# Patient Record
Sex: MALE | Race: WHITE | HISPANIC OR LATINO | Employment: UNEMPLOYED | URBAN - METROPOLITAN AREA
[De-identification: names, ages, dates, MRNs, and addresses within clinical notes are randomized per-mention and may not be internally consistent; named-entity substitution may affect disease eponyms.]

---
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
## 2022-12-11 ENCOUNTER — APPOINTMENT (OUTPATIENT)
Dept: RADIOLOGY | Facility: MEDICAL CENTER | Age: 22
DRG: 481 | End: 2022-12-11
Attending: EMERGENCY MEDICINE
Payer: COMMERCIAL

## 2022-12-11 ENCOUNTER — HOSPITAL ENCOUNTER (INPATIENT)
Facility: MEDICAL CENTER | Age: 22
LOS: 3 days | DRG: 481 | End: 2022-12-14
Attending: EMERGENCY MEDICINE | Admitting: STUDENT IN AN ORGANIZED HEALTH CARE EDUCATION/TRAINING PROGRAM
Payer: COMMERCIAL

## 2022-12-11 ENCOUNTER — APPOINTMENT (OUTPATIENT)
Dept: RADIOLOGY | Facility: MEDICAL CENTER | Age: 22
DRG: 481 | End: 2022-12-11
Attending: STUDENT IN AN ORGANIZED HEALTH CARE EDUCATION/TRAINING PROGRAM
Payer: COMMERCIAL

## 2022-12-11 DIAGNOSIS — E87.6 HYPOKALEMIA: ICD-10-CM

## 2022-12-11 DIAGNOSIS — S72.91XA CLOSED FRACTURE OF RIGHT FEMUR, UNSPECIFIED FRACTURE MORPHOLOGY, UNSPECIFIED PORTION OF FEMUR, INITIAL ENCOUNTER (HCC): Primary | ICD-10-CM

## 2022-12-11 DIAGNOSIS — E86.0 DEHYDRATION: ICD-10-CM

## 2022-12-11 DIAGNOSIS — F10.10 ALCOHOL ABUSE: ICD-10-CM

## 2022-12-11 PROBLEM — S72.8X1A OTHER FRACTURE OF RIGHT FEMUR, INITIAL ENCOUNTER FOR CLOSED FRACTURE (HCC): Status: ACTIVE | Noted: 2022-12-11

## 2022-12-11 LAB
ALBUMIN SERPL BCP-MCNC: 4.4 G/DL (ref 3.2–4.9)
ALBUMIN/GLOB SERPL: 1.3 G/DL
ALP SERPL-CCNC: 123 U/L (ref 30–99)
ALT SERPL-CCNC: 13 U/L (ref 2–50)
ANION GAP SERPL CALC-SCNC: 20 MMOL/L (ref 7–16)
AST SERPL-CCNC: 16 U/L (ref 12–45)
BASOPHILS # BLD AUTO: 0.6 % (ref 0–1.8)
BASOPHILS # BLD: 0.08 K/UL (ref 0–0.12)
BILIRUB SERPL-MCNC: 0.3 MG/DL (ref 0.1–1.5)
BUN SERPL-MCNC: 12 MG/DL (ref 8–22)
CALCIUM SERPL-MCNC: 9.4 MG/DL (ref 8.5–10.5)
CHLORIDE SERPL-SCNC: 103 MMOL/L (ref 96–112)
CO2 SERPL-SCNC: 18 MMOL/L (ref 20–33)
CREAT SERPL-MCNC: 0.7 MG/DL (ref 0.5–1.4)
EKG IMPRESSION: NORMAL
EOSINOPHIL # BLD AUTO: 0.16 K/UL (ref 0–0.51)
EOSINOPHIL NFR BLD: 1.2 % (ref 0–6.9)
ERYTHROCYTE [DISTWIDTH] IN BLOOD BY AUTOMATED COUNT: 44.7 FL (ref 35.9–50)
GFR SERPLBLD CREATININE-BSD FMLA CKD-EPI: 135 ML/MIN/1.73 M 2
GLOBULIN SER CALC-MCNC: 3.4 G/DL (ref 1.9–3.5)
GLUCOSE SERPL-MCNC: 132 MG/DL (ref 65–99)
HCT VFR BLD AUTO: 44.2 % (ref 42–52)
HGB BLD-MCNC: 14.8 G/DL (ref 14–18)
IMM GRANULOCYTES # BLD AUTO: 0.05 K/UL (ref 0–0.11)
IMM GRANULOCYTES NFR BLD AUTO: 0.4 % (ref 0–0.9)
LYMPHOCYTES # BLD AUTO: 3.77 K/UL (ref 1–4.8)
LYMPHOCYTES NFR BLD: 27.7 % (ref 22–41)
MCH RBC QN AUTO: 30.3 PG (ref 27–33)
MCHC RBC AUTO-ENTMCNC: 33.5 G/DL (ref 33.7–35.3)
MCV RBC AUTO: 90.6 FL (ref 81.4–97.8)
MONOCYTES # BLD AUTO: 1.2 K/UL (ref 0–0.85)
MONOCYTES NFR BLD AUTO: 8.8 % (ref 0–13.4)
NEUTROPHILS # BLD AUTO: 8.37 K/UL (ref 1.82–7.42)
NEUTROPHILS NFR BLD: 61.3 % (ref 44–72)
NRBC # BLD AUTO: 0 K/UL
NRBC BLD-RTO: 0 /100 WBC
PLATELET # BLD AUTO: 248 K/UL (ref 164–446)
PMV BLD AUTO: 11.1 FL (ref 9–12.9)
POTASSIUM SERPL-SCNC: 3 MMOL/L (ref 3.6–5.5)
PROT SERPL-MCNC: 7.8 G/DL (ref 6–8.2)
RBC # BLD AUTO: 4.88 M/UL (ref 4.7–6.1)
SODIUM SERPL-SCNC: 141 MMOL/L (ref 135–145)
WBC # BLD AUTO: 13.6 K/UL (ref 4.8–10.8)

## 2022-12-11 PROCEDURE — 85025 COMPLETE CBC W/AUTO DIFF WBC: CPT

## 2022-12-11 PROCEDURE — 0QSBXZZ REPOSITION RIGHT LOWER FEMUR, EXTERNAL APPROACH: ICD-10-PCS | Performed by: EMERGENCY MEDICINE

## 2022-12-11 PROCEDURE — 83735 ASSAY OF MAGNESIUM: CPT

## 2022-12-11 PROCEDURE — 96374 THER/PROPH/DIAG INJ IV PUSH: CPT

## 2022-12-11 PROCEDURE — 99291 CRITICAL CARE FIRST HOUR: CPT

## 2022-12-11 PROCEDURE — 99223 1ST HOSP IP/OBS HIGH 75: CPT | Performed by: STUDENT IN AN ORGANIZED HEALTH CARE EDUCATION/TRAINING PROGRAM

## 2022-12-11 PROCEDURE — 700117 HCHG RX CONTRAST REV CODE 255: Performed by: EMERGENCY MEDICINE

## 2022-12-11 PROCEDURE — 80053 COMPREHEN METABOLIC PANEL: CPT

## 2022-12-11 PROCEDURE — 700101 HCHG RX REV CODE 250: Performed by: EMERGENCY MEDICINE

## 2022-12-11 PROCEDURE — 700105 HCHG RX REV CODE 258: Performed by: EMERGENCY MEDICINE

## 2022-12-11 PROCEDURE — 93005 ELECTROCARDIOGRAM TRACING: CPT | Performed by: EMERGENCY MEDICINE

## 2022-12-11 PROCEDURE — 73706 CT ANGIO LWR EXTR W/O&W/DYE: CPT | Mod: RT

## 2022-12-11 PROCEDURE — 96376 TX/PRO/DX INJ SAME DRUG ADON: CPT

## 2022-12-11 PROCEDURE — 36415 COLL VENOUS BLD VENIPUNCTURE: CPT

## 2022-12-11 PROCEDURE — 770001 HCHG ROOM/CARE - MED/SURG/GYN PRIV*

## 2022-12-11 RX ORDER — ONDANSETRON 2 MG/ML
4 INJECTION INTRAMUSCULAR; INTRAVENOUS EVERY 4 HOURS PRN
Status: DISCONTINUED | OUTPATIENT
Start: 2022-12-11 | End: 2022-12-14 | Stop reason: HOSPADM

## 2022-12-11 RX ORDER — OXYCODONE HYDROCHLORIDE 5 MG/1
5 TABLET ORAL
Status: DISCONTINUED | OUTPATIENT
Start: 2022-12-11 | End: 2022-12-14 | Stop reason: HOSPADM

## 2022-12-11 RX ORDER — OXYCODONE HYDROCHLORIDE 10 MG/1
10 TABLET ORAL
Status: DISCONTINUED | OUTPATIENT
Start: 2022-12-11 | End: 2022-12-14 | Stop reason: HOSPADM

## 2022-12-11 RX ORDER — SODIUM CHLORIDE 9 MG/ML
1000 INJECTION, SOLUTION INTRAVENOUS ONCE
Status: COMPLETED | OUTPATIENT
Start: 2022-12-11 | End: 2022-12-12

## 2022-12-11 RX ORDER — SODIUM CHLORIDE AND POTASSIUM CHLORIDE 150; 900 MG/100ML; MG/100ML
INJECTION, SOLUTION INTRAVENOUS CONTINUOUS
Status: DISCONTINUED | OUTPATIENT
Start: 2022-12-12 | End: 2022-12-13

## 2022-12-11 RX ORDER — HYDROMORPHONE HYDROCHLORIDE 1 MG/ML
0.5 INJECTION, SOLUTION INTRAMUSCULAR; INTRAVENOUS; SUBCUTANEOUS
Status: DISCONTINUED | OUTPATIENT
Start: 2022-12-11 | End: 2022-12-14 | Stop reason: HOSPADM

## 2022-12-11 RX ORDER — MORPHINE SULFATE 4 MG/ML
2 INJECTION INTRAVENOUS EVERY 4 HOURS PRN
Status: DISCONTINUED | OUTPATIENT
Start: 2022-12-11 | End: 2022-12-13

## 2022-12-11 RX ORDER — ACETAMINOPHEN 500 MG
1000 TABLET ORAL EVERY 6 HOURS
Status: DISCONTINUED | OUTPATIENT
Start: 2022-12-12 | End: 2022-12-14 | Stop reason: HOSPADM

## 2022-12-11 RX ORDER — ACETAMINOPHEN 500 MG
1000 TABLET ORAL EVERY 6 HOURS PRN
Status: DISCONTINUED | OUTPATIENT
Start: 2022-12-17 | End: 2022-12-14 | Stop reason: HOSPADM

## 2022-12-11 RX ADMIN — IOHEXOL 100 ML: 350 INJECTION, SOLUTION INTRAVENOUS at 23:42

## 2022-12-11 RX ADMIN — KETAMINE HYDROCHLORIDE 25 MG: 10 INJECTION INTRAMUSCULAR; INTRAVENOUS at 23:52

## 2022-12-11 RX ADMIN — KETAMINE HYDROCHLORIDE 25 MG: 10 INJECTION INTRAMUSCULAR; INTRAVENOUS at 22:56

## 2022-12-11 RX ADMIN — POTASSIUM CHLORIDE AND SODIUM CHLORIDE: 900; 150 INJECTION, SOLUTION INTRAVENOUS at 23:52

## 2022-12-11 RX ADMIN — SODIUM CHLORIDE 1000 ML: 9 INJECTION, SOLUTION INTRAVENOUS at 23:58

## 2022-12-11 ASSESSMENT — FIBROSIS 4 INDEX: FIB4 SCORE: 0.32

## 2022-12-12 ENCOUNTER — APPOINTMENT (OUTPATIENT)
Dept: RADIOLOGY | Facility: MEDICAL CENTER | Age: 22
DRG: 481 | End: 2022-12-12
Attending: ORTHOPAEDIC SURGERY
Payer: COMMERCIAL

## 2022-12-12 ENCOUNTER — APPOINTMENT (OUTPATIENT)
Dept: RADIOLOGY | Facility: MEDICAL CENTER | Age: 22
DRG: 481 | End: 2022-12-12
Attending: STUDENT IN AN ORGANIZED HEALTH CARE EDUCATION/TRAINING PROGRAM
Payer: COMMERCIAL

## 2022-12-12 ENCOUNTER — ANESTHESIA (OUTPATIENT)
Dept: SURGERY | Facility: MEDICAL CENTER | Age: 22
DRG: 481 | End: 2022-12-12
Payer: COMMERCIAL

## 2022-12-12 ENCOUNTER — ANESTHESIA EVENT (OUTPATIENT)
Dept: SURGERY | Facility: MEDICAL CENTER | Age: 22
DRG: 481 | End: 2022-12-12
Payer: COMMERCIAL

## 2022-12-12 PROBLEM — K29.70 GASTRITIS: Status: ACTIVE | Noted: 2022-12-12

## 2022-12-12 PROBLEM — S72.8X1A OTHER FRACTURE OF RIGHT FEMUR, INITIAL ENCOUNTER FOR CLOSED FRACTURE (HCC): Status: RESOLVED | Noted: 2022-12-11 | Resolved: 2022-12-12

## 2022-12-12 PROBLEM — S72.401A CLOSED FRACTURE OF DISTAL END OF RIGHT FEMUR (HCC): Status: ACTIVE | Noted: 2022-12-12

## 2022-12-12 PROBLEM — F17.200 TOBACCO DEPENDENCE: Status: ACTIVE | Noted: 2022-12-12

## 2022-12-12 PROBLEM — E87.29 HIGH ANION GAP METABOLIC ACIDOSIS: Status: ACTIVE | Noted: 2022-12-12

## 2022-12-12 PROBLEM — E87.6 HYPOKALEMIA: Status: ACTIVE | Noted: 2022-12-12

## 2022-12-12 PROBLEM — E86.0 DEHYDRATION: Status: RESOLVED | Noted: 2022-12-12 | Resolved: 2022-12-12

## 2022-12-12 PROBLEM — E86.0 DEHYDRATION: Status: ACTIVE | Noted: 2022-12-12

## 2022-12-12 LAB
ANION GAP SERPL CALC-SCNC: 9 MMOL/L (ref 7–16)
BUN SERPL-MCNC: 6 MG/DL (ref 8–22)
CALCIUM SERPL-MCNC: 8.6 MG/DL (ref 8.5–10.5)
CHLORIDE SERPL-SCNC: 109 MMOL/L (ref 96–112)
CO2 SERPL-SCNC: 21 MMOL/L (ref 20–33)
CREAT SERPL-MCNC: 0.51 MG/DL (ref 0.5–1.4)
GFR SERPLBLD CREATININE-BSD FMLA CKD-EPI: 149 ML/MIN/1.73 M 2
GLUCOSE SERPL-MCNC: 126 MG/DL (ref 65–99)
MAGNESIUM SERPL-MCNC: 2 MG/DL (ref 1.5–2.5)
POTASSIUM SERPL-SCNC: 4.2 MMOL/L (ref 3.6–5.5)
SODIUM SERPL-SCNC: 139 MMOL/L (ref 135–145)

## 2022-12-12 PROCEDURE — 96376 TX/PRO/DX INJ SAME DRUG ADON: CPT

## 2022-12-12 PROCEDURE — 27502 TREATMENT OF THIGH FRACTURE: CPT

## 2022-12-12 PROCEDURE — 80048 BASIC METABOLIC PNL TOTAL CA: CPT

## 2022-12-12 PROCEDURE — 99232 SBSQ HOSP IP/OBS MODERATE 35: CPT | Performed by: STUDENT IN AN ORGANIZED HEALTH CARE EDUCATION/TRAINING PROGRAM

## 2022-12-12 PROCEDURE — 770001 HCHG ROOM/CARE - MED/SURG/GYN PRIV*

## 2022-12-12 PROCEDURE — 160009 HCHG ANES TIME/MIN: Performed by: ORTHOPAEDIC SURGERY

## 2022-12-12 PROCEDURE — 700102 HCHG RX REV CODE 250 W/ 637 OVERRIDE(OP): Performed by: STUDENT IN AN ORGANIZED HEALTH CARE EDUCATION/TRAINING PROGRAM

## 2022-12-12 PROCEDURE — 73552 X-RAY EXAM OF FEMUR 2/>: CPT | Mod: RT

## 2022-12-12 PROCEDURE — A9270 NON-COVERED ITEM OR SERVICE: HCPCS | Performed by: STUDENT IN AN ORGANIZED HEALTH CARE EDUCATION/TRAINING PROGRAM

## 2022-12-12 PROCEDURE — 700111 HCHG RX REV CODE 636 W/ 250 OVERRIDE (IP): Performed by: ORTHOPAEDIC SURGERY

## 2022-12-12 PROCEDURE — 110371 HCHG SHELL REV 272: Performed by: ORTHOPAEDIC SURGERY

## 2022-12-12 PROCEDURE — 700105 HCHG RX REV CODE 258: Performed by: STUDENT IN AN ORGANIZED HEALTH CARE EDUCATION/TRAINING PROGRAM

## 2022-12-12 PROCEDURE — 700111 HCHG RX REV CODE 636 W/ 250 OVERRIDE (IP): Performed by: STUDENT IN AN ORGANIZED HEALTH CARE EDUCATION/TRAINING PROGRAM

## 2022-12-12 PROCEDURE — 94799 UNLISTED PULMONARY SVC/PX: CPT

## 2022-12-12 PROCEDURE — 27506 TREATMENT OF THIGH FRACTURE: CPT | Mod: RT | Performed by: ORTHOPAEDIC SURGERY

## 2022-12-12 PROCEDURE — 160035 HCHG PACU - 1ST 60 MINS PHASE I: Performed by: ORTHOPAEDIC SURGERY

## 2022-12-12 PROCEDURE — 96375 TX/PRO/DX INJ NEW DRUG ADDON: CPT

## 2022-12-12 PROCEDURE — 160048 HCHG OR STATISTICAL LEVEL 1-5: Performed by: ORTHOPAEDIC SURGERY

## 2022-12-12 PROCEDURE — 160002 HCHG RECOVERY MINUTES (STAT): Performed by: ORTHOPAEDIC SURGERY

## 2022-12-12 PROCEDURE — 99284 EMERGENCY DEPT VISIT MOD MDM: CPT | Mod: 25 | Performed by: STUDENT IN AN ORGANIZED HEALTH CARE EDUCATION/TRAINING PROGRAM

## 2022-12-12 PROCEDURE — 700111 HCHG RX REV CODE 636 W/ 250 OVERRIDE (IP): Performed by: EMERGENCY MEDICINE

## 2022-12-12 PROCEDURE — 71045 X-RAY EXAM CHEST 1 VIEW: CPT

## 2022-12-12 PROCEDURE — 27506 TREATMENT OF THIGH FRACTURE: CPT | Mod: 80ROC,RT | Performed by: STUDENT IN AN ORGANIZED HEALTH CARE EDUCATION/TRAINING PROGRAM

## 2022-12-12 PROCEDURE — 01220 ANES CLSD PX UPPER 2/3 FEMUR: CPT | Performed by: STUDENT IN AN ORGANIZED HEALTH CARE EDUCATION/TRAINING PROGRAM

## 2022-12-12 PROCEDURE — 700101 HCHG RX REV CODE 250: Performed by: STUDENT IN AN ORGANIZED HEALTH CARE EDUCATION/TRAINING PROGRAM

## 2022-12-12 PROCEDURE — 0QSB04Z REPOSITION RIGHT LOWER FEMUR WITH INTERNAL FIXATION DEVICE, OPEN APPROACH: ICD-10-PCS | Performed by: ORTHOPAEDIC SURGERY

## 2022-12-12 PROCEDURE — 160041 HCHG SURGERY MINUTES - EA ADDL 1 MIN LEVEL 4: Performed by: ORTHOPAEDIC SURGERY

## 2022-12-12 PROCEDURE — 160029 HCHG SURGERY MINUTES - 1ST 30 MINS LEVEL 4: Performed by: ORTHOPAEDIC SURGERY

## 2022-12-12 PROCEDURE — C1713 ANCHOR/SCREW BN/BN,TIS/BN: HCPCS | Performed by: ORTHOPAEDIC SURGERY

## 2022-12-12 DEVICE — IMPLANTABLE DEVICE: Type: IMPLANTABLE DEVICE | Site: FEMUR | Status: FUNCTIONAL

## 2022-12-12 DEVICE — SCREW CROSS LOCK 5MM X 55MM (4TX5=20): Type: IMPLANTABLE DEVICE | Site: FEMUR | Status: FUNCTIONAL

## 2022-12-12 DEVICE — SCREW CROSS LOCK 5MM X 65MM (4TX5=20): Type: IMPLANTABLE DEVICE | Site: FEMUR | Status: FUNCTIONAL

## 2022-12-12 DEVICE — SCREW CROSS LOCK 5MM X 37.5MM (4TX5=20): Type: IMPLANTABLE DEVICE | Site: FEMUR | Status: FUNCTIONAL

## 2022-12-12 RX ORDER — CEFAZOLIN SODIUM 1 G/50ML
1 INJECTION, SOLUTION INTRAVENOUS EVERY 8 HOURS
Status: COMPLETED | OUTPATIENT
Start: 2022-12-12 | End: 2022-12-13

## 2022-12-12 RX ORDER — CEFAZOLIN SODIUM 1 G/3ML
INJECTION, POWDER, FOR SOLUTION INTRAMUSCULAR; INTRAVENOUS PRN
Status: DISCONTINUED | OUTPATIENT
Start: 2022-12-12 | End: 2022-12-12 | Stop reason: SURG

## 2022-12-12 RX ORDER — ROCURONIUM BROMIDE 10 MG/ML
INJECTION, SOLUTION INTRAVENOUS PRN
Status: DISCONTINUED | OUTPATIENT
Start: 2022-12-12 | End: 2022-12-12 | Stop reason: SURG

## 2022-12-12 RX ORDER — ONDANSETRON 2 MG/ML
INJECTION INTRAMUSCULAR; INTRAVENOUS PRN
Status: DISCONTINUED | OUTPATIENT
Start: 2022-12-12 | End: 2022-12-12 | Stop reason: SURG

## 2022-12-12 RX ORDER — HALOPERIDOL 5 MG/ML
1 INJECTION INTRAMUSCULAR
Status: DISCONTINUED | OUTPATIENT
Start: 2022-12-12 | End: 2022-12-12 | Stop reason: HOSPADM

## 2022-12-12 RX ORDER — HYDROMORPHONE HYDROCHLORIDE 1 MG/ML
0.4 INJECTION, SOLUTION INTRAMUSCULAR; INTRAVENOUS; SUBCUTANEOUS
Status: DISCONTINUED | OUTPATIENT
Start: 2022-12-12 | End: 2022-12-12 | Stop reason: HOSPADM

## 2022-12-12 RX ORDER — SODIUM CHLORIDE 9 MG/ML
500 INJECTION, SOLUTION INTRAVENOUS
Status: CANCELLED | OUTPATIENT
Start: 2022-12-12 | End: 2022-12-12

## 2022-12-12 RX ORDER — PROPOFOL 10 MG/ML
200 INJECTION, EMULSION INTRAVENOUS ONCE
Status: COMPLETED | OUTPATIENT
Start: 2022-12-12 | End: 2022-12-12

## 2022-12-12 RX ORDER — OXYCODONE HCL 5 MG/5 ML
5 SOLUTION, ORAL ORAL
Status: COMPLETED | OUTPATIENT
Start: 2022-12-12 | End: 2022-12-12

## 2022-12-12 RX ORDER — ONDANSETRON 2 MG/ML
4 INJECTION INTRAMUSCULAR; INTRAVENOUS
Status: DISCONTINUED | OUTPATIENT
Start: 2022-12-12 | End: 2022-12-12 | Stop reason: HOSPADM

## 2022-12-12 RX ORDER — SODIUM CHLORIDE, SODIUM LACTATE, POTASSIUM CHLORIDE, CALCIUM CHLORIDE 600; 310; 30; 20 MG/100ML; MG/100ML; MG/100ML; MG/100ML
INJECTION, SOLUTION INTRAVENOUS
Status: DISCONTINUED | OUTPATIENT
Start: 2022-12-12 | End: 2022-12-12 | Stop reason: SURG

## 2022-12-12 RX ORDER — DIPHENHYDRAMINE HYDROCHLORIDE 50 MG/ML
12.5 INJECTION INTRAMUSCULAR; INTRAVENOUS
Status: DISCONTINUED | OUTPATIENT
Start: 2022-12-12 | End: 2022-12-12 | Stop reason: HOSPADM

## 2022-12-12 RX ORDER — HYDROMORPHONE HYDROCHLORIDE 1 MG/ML
1 INJECTION, SOLUTION INTRAMUSCULAR; INTRAVENOUS; SUBCUTANEOUS ONCE
Status: COMPLETED | OUTPATIENT
Start: 2022-12-12 | End: 2022-12-12

## 2022-12-12 RX ORDER — HYDROMORPHONE HYDROCHLORIDE 1 MG/ML
0.2 INJECTION, SOLUTION INTRAMUSCULAR; INTRAVENOUS; SUBCUTANEOUS
Status: DISCONTINUED | OUTPATIENT
Start: 2022-12-12 | End: 2022-12-12 | Stop reason: HOSPADM

## 2022-12-12 RX ORDER — ACETAMINOPHEN 500 MG
1000 TABLET ORAL ONCE
Status: COMPLETED | OUTPATIENT
Start: 2022-12-12 | End: 2022-12-12

## 2022-12-12 RX ORDER — MEPERIDINE HYDROCHLORIDE 25 MG/ML
12.5 INJECTION INTRAMUSCULAR; INTRAVENOUS; SUBCUTANEOUS
Status: DISCONTINUED | OUTPATIENT
Start: 2022-12-12 | End: 2022-12-12 | Stop reason: HOSPADM

## 2022-12-12 RX ORDER — OMEPRAZOLE 20 MG/1
20 CAPSULE, DELAYED RELEASE ORAL DAILY
Status: DISCONTINUED | OUTPATIENT
Start: 2022-12-12 | End: 2022-12-14 | Stop reason: HOSPADM

## 2022-12-12 RX ORDER — LIDOCAINE HYDROCHLORIDE 20 MG/ML
INJECTION, SOLUTION EPIDURAL; INFILTRATION; INTRACAUDAL; PERINEURAL PRN
Status: DISCONTINUED | OUTPATIENT
Start: 2022-12-12 | End: 2022-12-12 | Stop reason: SURG

## 2022-12-12 RX ORDER — DEXAMETHASONE SODIUM PHOSPHATE 4 MG/ML
INJECTION, SOLUTION INTRA-ARTICULAR; INTRALESIONAL; INTRAMUSCULAR; INTRAVENOUS; SOFT TISSUE PRN
Status: DISCONTINUED | OUTPATIENT
Start: 2022-12-12 | End: 2022-12-12 | Stop reason: SURG

## 2022-12-12 RX ORDER — ENOXAPARIN SODIUM 100 MG/ML
40 INJECTION SUBCUTANEOUS DAILY
Status: DISCONTINUED | OUTPATIENT
Start: 2022-12-13 | End: 2022-12-14 | Stop reason: HOSPADM

## 2022-12-12 RX ORDER — SODIUM CHLORIDE, SODIUM LACTATE, POTASSIUM CHLORIDE, CALCIUM CHLORIDE 600; 310; 30; 20 MG/100ML; MG/100ML; MG/100ML; MG/100ML
INJECTION, SOLUTION INTRAVENOUS CONTINUOUS
Status: DISCONTINUED | OUTPATIENT
Start: 2022-12-12 | End: 2022-12-12 | Stop reason: HOSPADM

## 2022-12-12 RX ORDER — HYDROMORPHONE HYDROCHLORIDE 1 MG/ML
0.1 INJECTION, SOLUTION INTRAMUSCULAR; INTRAVENOUS; SUBCUTANEOUS
Status: DISCONTINUED | OUTPATIENT
Start: 2022-12-12 | End: 2022-12-12 | Stop reason: HOSPADM

## 2022-12-12 RX ORDER — POTASSIUM CHLORIDE 20 MEQ/1
40 TABLET, EXTENDED RELEASE ORAL ONCE
Status: COMPLETED | OUTPATIENT
Start: 2022-12-12 | End: 2022-12-12

## 2022-12-12 RX ORDER — OXYCODONE HCL 5 MG/5 ML
10 SOLUTION, ORAL ORAL
Status: COMPLETED | OUTPATIENT
Start: 2022-12-12 | End: 2022-12-12

## 2022-12-12 RX ADMIN — FENTANYL CITRATE 50 MCG: 50 INJECTION INTRAMUSCULAR; INTRAVENOUS at 10:24

## 2022-12-12 RX ADMIN — OXYCODONE HYDROCHLORIDE 10 MG: 10 TABLET ORAL at 04:15

## 2022-12-12 RX ADMIN — PROPOFOL 160 MG: 10 INJECTION, EMULSION INTRAVENOUS at 09:10

## 2022-12-12 RX ADMIN — DEXAMETHASONE SODIUM PHOSPHATE 8 MG: 4 INJECTION, SOLUTION INTRA-ARTICULAR; INTRALESIONAL; INTRAMUSCULAR; INTRAVENOUS; SOFT TISSUE at 09:15

## 2022-12-12 RX ADMIN — LIDOCAINE HYDROCHLORIDE 80 MG: 20 INJECTION, SOLUTION EPIDURAL; INFILTRATION; INTRACAUDAL at 09:10

## 2022-12-12 RX ADMIN — HYDROMORPHONE HYDROCHLORIDE 0.5 MG: 1 INJECTION, SOLUTION INTRAMUSCULAR; INTRAVENOUS; SUBCUTANEOUS at 05:42

## 2022-12-12 RX ADMIN — CEFAZOLIN SODIUM 1 G: 1 INJECTION, SOLUTION INTRAVENOUS at 23:16

## 2022-12-12 RX ADMIN — FENTANYL CITRATE 100 MCG: 50 INJECTION, SOLUTION INTRAMUSCULAR; INTRAVENOUS at 09:10

## 2022-12-12 RX ADMIN — HYDROMORPHONE HYDROCHLORIDE 1 MG: 1 INJECTION, SOLUTION INTRAMUSCULAR; INTRAVENOUS; SUBCUTANEOUS at 02:00

## 2022-12-12 RX ADMIN — ACETAMINOPHEN 1000 MG: 500 TABLET ORAL at 13:34

## 2022-12-12 RX ADMIN — ACETAMINOPHEN 1000 MG: 500 TABLET ORAL at 02:00

## 2022-12-12 RX ADMIN — CEFAZOLIN SODIUM 1 G: 1 INJECTION, SOLUTION INTRAVENOUS at 17:42

## 2022-12-12 RX ADMIN — FENTANYL CITRATE 50 MCG: 50 INJECTION, SOLUTION INTRAMUSCULAR; INTRAVENOUS at 10:08

## 2022-12-12 RX ADMIN — ACETAMINOPHEN 1000 MG: 500 TABLET ORAL at 08:21

## 2022-12-12 RX ADMIN — ACETAMINOPHEN 1000 MG: 500 TABLET ORAL at 17:42

## 2022-12-12 RX ADMIN — SUGAMMADEX 200 MG: 100 INJECTION, SOLUTION INTRAVENOUS at 10:02

## 2022-12-12 RX ADMIN — PROPOFOL 200 MG: 10 INJECTION, EMULSION INTRAVENOUS at 01:25

## 2022-12-12 RX ADMIN — POTASSIUM CHLORIDE 40 MEQ: 1500 TABLET, EXTENDED RELEASE ORAL at 02:00

## 2022-12-12 RX ADMIN — ONDANSETRON 4 MG: 2 INJECTION INTRAMUSCULAR; INTRAVENOUS at 10:02

## 2022-12-12 RX ADMIN — FENTANYL CITRATE 25 MCG: 50 INJECTION, SOLUTION INTRAMUSCULAR; INTRAVENOUS at 10:44

## 2022-12-12 RX ADMIN — FENTANYL CITRATE 25 MCG: 50 INJECTION, SOLUTION INTRAMUSCULAR; INTRAVENOUS at 10:34

## 2022-12-12 RX ADMIN — ROCURONIUM BROMIDE 50 MG: 10 INJECTION, SOLUTION INTRAVENOUS at 09:10

## 2022-12-12 RX ADMIN — OXYCODONE HYDROCHLORIDE 10 MG: 10 TABLET ORAL at 07:45

## 2022-12-12 RX ADMIN — OXYCODONE HYDROCHLORIDE 10 MG: 5 SOLUTION ORAL at 10:23

## 2022-12-12 RX ADMIN — CEFAZOLIN 2 G: 330 INJECTION, POWDER, FOR SOLUTION INTRAMUSCULAR; INTRAVENOUS at 09:13

## 2022-12-12 RX ADMIN — FENTANYL CITRATE 50 MCG: 50 INJECTION, SOLUTION INTRAMUSCULAR; INTRAVENOUS at 09:56

## 2022-12-12 RX ADMIN — SODIUM CHLORIDE, POTASSIUM CHLORIDE, SODIUM LACTATE AND CALCIUM CHLORIDE: 600; 310; 30; 20 INJECTION, SOLUTION INTRAVENOUS at 09:05

## 2022-12-12 RX ADMIN — MEPERIDINE HYDROCHLORIDE 12.5 MG: 25 INJECTION INTRAMUSCULAR; INTRAVENOUS; SUBCUTANEOUS at 10:21

## 2022-12-12 ASSESSMENT — COGNITIVE AND FUNCTIONAL STATUS - GENERAL
PERSONAL GROOMING: A LITTLE
DRESSING REGULAR LOWER BODY CLOTHING: A LITTLE
TOILETING: A LITTLE
DRESSING REGULAR UPPER BODY CLOTHING: A LITTLE
MOVING FROM LYING ON BACK TO SITTING ON SIDE OF FLAT BED: A LITTLE
WALKING IN HOSPITAL ROOM: A LITTLE
HELP NEEDED FOR BATHING: A LITTLE
DAILY ACTIVITIY SCORE: 19
MOVING TO AND FROM BED TO CHAIR: A LITTLE
CLIMB 3 TO 5 STEPS WITH RAILING: A LOT
SUGGESTED CMS G CODE MODIFIER MOBILITY: CK
MOBILITY SCORE: 17
STANDING UP FROM CHAIR USING ARMS: A LITTLE
TURNING FROM BACK TO SIDE WHILE IN FLAT BAD: A LITTLE
SUGGESTED CMS G CODE MODIFIER DAILY ACTIVITY: CK

## 2022-12-12 ASSESSMENT — LIFESTYLE VARIABLES
ALCOHOL_USE: NO
HAVE YOU EVER FELT YOU SHOULD CUT DOWN ON YOUR DRINKING: NO
TOTAL SCORE: 0
DOES PATIENT WANT TO STOP DRINKING: NO
TOTAL SCORE: 0
HAVE PEOPLE ANNOYED YOU BY CRITICIZING YOUR DRINKING: NO
CONSUMPTION TOTAL: INCOMPLETE
EVER HAD A DRINK FIRST THING IN THE MORNING TO STEADY YOUR NERVES TO GET RID OF A HANGOVER: NO
EVER FELT BAD OR GUILTY ABOUT YOUR DRINKING: NO
TOTAL SCORE: 0

## 2022-12-12 ASSESSMENT — ENCOUNTER SYMPTOMS
MYALGIAS: 0
CHILLS: 0
HEADACHES: 0
HEARTBURN: 0
BLURRED VISION: 0
NECK PAIN: 0
FEVER: 0
HEMOPTYSIS: 0
NAUSEA: 0
PALPITATIONS: 0
DIZZINESS: 0
DOUBLE VISION: 0
DEPRESSION: 0
COUGH: 0
BRUISES/BLEEDS EASILY: 0

## 2022-12-12 ASSESSMENT — PAIN DESCRIPTION - PAIN TYPE
TYPE: ACUTE PAIN
TYPE: SURGICAL PAIN
TYPE: ACUTE PAIN

## 2022-12-12 ASSESSMENT — PATIENT HEALTH QUESTIONNAIRE - PHQ9
1. LITTLE INTEREST OR PLEASURE IN DOING THINGS: NOT AT ALL
2. FEELING DOWN, DEPRESSED, IRRITABLE, OR HOPELESS: NOT AT ALL
SUM OF ALL RESPONSES TO PHQ9 QUESTIONS 1 AND 2: 0

## 2022-12-12 ASSESSMENT — FIBROSIS 4 INDEX: FIB4 SCORE: 0.36

## 2022-12-12 NOTE — ED PROVIDER NOTES
ED Provider Note    Scribed for Js Baugh by Flaco Tobin. 12/11/2022  10:32 PM    Primary care provider: Pcp Pt States None  Means of arrival: Walk in  History obtained from: Patient  History limited by: None    CHIEF COMPLAINT  Chief Complaint   Patient presents with    T-5000 Extremity Pain     PT reports wrestling with friend and herd a loud pop and now with deformity to right upper thigh      HPI  Julio Garrett is a 22 y.o. male who presents to the Emergency Department for evaluation of a moderate right lower extremity pain onset prior to arrival. He has a history of gastritis. The patient states that while wrestling with some friends earlier today he suddenly heard a loud pop and subsequently began experiencing pounding pain primarily localized to the right upper thigh. Associated symptoms include reduced ambulatory capacity. The patient denies any right lower extremity swelling or loss of sensation to the right lower extremity. His right leg pain is exacerbated by any movement or pressure. No alleviating factors noted. The patient endorses consuming a few shots of whiskey prior to arriving to the ED. He denies any tobacco or illicit drug use. Julio does not take any daily medications.     Quality:Pounding  Duration: Prior to arrival  Severity: Moderate  Associated sx: reduced ambulatory capacity.     REVIEW OF SYSTEMS  As above, all other systems reviewed and are negative.   See HPI for further details.     PAST MEDICAL HISTORY     SURGICAL HISTORY  patient denies any surgical history  SOCIAL HISTORY  Social History     Tobacco Use    Smoking status: Never    Smokeless tobacco: Never   Vaping Use    Vaping Use: Former    Quit date: 9/6/2020    Substances: Nicotine   Substance Use Topics    Alcohol use: Yes    Drug use: No      Social History     Substance and Sexual Activity   Drug Use No     FAMILY HISTORY  No family history noted.  CURRENT MEDICATIONS  Home Medications       Reviewed  by Antoinette Mcmullen PhT (Pharmacy Tech) on 12/11/22 at 2355  Med List Status: Complete     Medication Last Dose Status   omeprazole (PRILOSEC) 20 MG delayed-release capsule 12/10/2022 Active                  ALLERGIES  No Known Allergies    PHYSICAL EXAM    VITAL SIGNS:   Vitals:    12/12/22 0120 12/12/22 0123 12/12/22 0125 12/12/22 0128   BP:  128/61 130/65 124/62   Pulse: 99 72 80 86   Resp: (!) 22 17  (!) 23   Temp:       TempSrc:       SpO2: 99% 98% 96% 96%   Weight:       Height:         Vitals: My interpretation: hypertensive, not tachycardic, afebrile, not hypoxic    Reinterpretation of vitals: Hypertensive, tachycardic, not hypoxic    Cardiac Monitor Interpretation: The cardiac monitor revealed normal Sinus Rhythm with tachycardia as interpreted by me. The cardiac monitor was ordered secondary to the patient's history of tachycardia and to monitor for dysrhythmia and/or tachycardia.    PE:   Constitutional: Well developed, Well nourished, No acute distress, Non-toxic appearance.   HENT: Normocephalic, Atraumatic, Bilateral external ears normal, Oropharynx is clear mucous membranes are moist. No oral exudates or nasal discharge.   Eyes: Pupils are equal round and reactive, EOMI, Conjunctiva normal, No discharge.   Neck: Normal range of motion, No tenderness, Supple, No stridor. No meningismus.  Lymphatic: No lymphadenopathy noted.   Cardiovascular: Regular rate and rhythm without murmur rub or gallop.  Thorax & Lungs: Clear breath sounds bilaterally without wheezes, rhonchi or rales. There is no chest wall tenderness.   Abdomen: Soft non-tender non-distended. There is no rebound or guarding. No organomegaly is appreciated. Bowel sounds are normal.  Skin: Normal without rash.   Back: No CVA or spinal tenderness.   Extremities: Obvious deformity to the distal right femur which is significantly tender to palpation, He was placed immediately in a traction device. Difficult to obtain pulses in the right lower  extremity but sensation and motor are intact, normal capillary refill and to have the same difficulty obtaining pulses in the left lower extremity as both are ice cold, No edema, No cyanosis, No clubbing. Capillary refill is less than 2 seconds.  Musculoskeletal: Good range of motion in all major joints. No tenderness to palpation or major deformities noted.   Neurologic: Alert & oriented x 3, Normal motor function, Normal sensory function, No focal deficits noted. Reflexes are normal.  Psychiatric: Affect normal, Judgment normal, Mood normal. There is no suicidal ideation or patient reported hallucinations.     DIAGNOSTIC STUDIES / PROCEDURES    LABS  Results for orders placed or performed during the hospital encounter of 12/11/22   CBC WITH DIFFERENTIAL   Result Value Ref Range    WBC 13.6 (H) 4.8 - 10.8 K/uL    RBC 4.88 4.70 - 6.10 M/uL    Hemoglobin 14.8 14.0 - 18.0 g/dL    Hematocrit 44.2 42.0 - 52.0 %    MCV 90.6 81.4 - 97.8 fL    MCH 30.3 27.0 - 33.0 pg    MCHC 33.5 (L) 33.7 - 35.3 g/dL    RDW 44.7 35.9 - 50.0 fL    Platelet Count 248 164 - 446 K/uL    MPV 11.1 9.0 - 12.9 fL    Neutrophils-Polys 61.30 44.00 - 72.00 %    Lymphocytes 27.70 22.00 - 41.00 %    Monocytes 8.80 0.00 - 13.40 %    Eosinophils 1.20 0.00 - 6.90 %    Basophils 0.60 0.00 - 1.80 %    Immature Granulocytes 0.40 0.00 - 0.90 %    Nucleated RBC 0.00 /100 WBC    Neutrophils (Absolute) 8.37 (H) 1.82 - 7.42 K/uL    Lymphs (Absolute) 3.77 1.00 - 4.80 K/uL    Monos (Absolute) 1.20 (H) 0.00 - 0.85 K/uL    Eos (Absolute) 0.16 0.00 - 0.51 K/uL    Baso (Absolute) 0.08 0.00 - 0.12 K/uL    Immature Granulocytes (abs) 0.05 0.00 - 0.11 K/uL    NRBC (Absolute) 0.00 K/uL   COMP METABOLIC PANEL   Result Value Ref Range    Sodium 141 135 - 145 mmol/L    Potassium 3.0 (L) 3.6 - 5.5 mmol/L    Chloride 103 96 - 112 mmol/L    Co2 18 (L) 20 - 33 mmol/L    Anion Gap 20.0 (H) 7.0 - 16.0    Glucose 132 (H) 65 - 99 mg/dL    Bun 12 8 - 22 mg/dL    Creatinine 0.70 0.50 -  1.40 mg/dL    Calcium 9.4 8.5 - 10.5 mg/dL    AST(SGOT) 16 12 - 45 U/L    ALT(SGPT) 13 2 - 50 U/L    Alkaline Phosphatase 123 (H) 30 - 99 U/L    Total Bilirubin 0.3 0.1 - 1.5 mg/dL    Albumin 4.4 3.2 - 4.9 g/dL    Total Protein 7.8 6.0 - 8.2 g/dL    Globulin 3.4 1.9 - 3.5 g/dL    A-G Ratio 1.3 g/dL   ESTIMATED GFR   Result Value Ref Range    GFR (CKD-EPI) 135 >60 mL/min/1.73 m 2   EKG (NOW)   Result Value Ref Range    Report       [facility] Emergency Dept.    Test Date:  2022  Pt Name:    FLORA NUÑEZ             Department: ER  MRN:        [id number]                      Room:       [facility]  Gender:     Male                         Technician: [id number]  :        2000                   Requested By:JS BAUGH  Order #:    [id number]                    Reading MD: Js Baugh    Measurements  Intervals                                Axis  Rate:       105                          P:          81  HI:         153                          QRS:        2  QRSD:       125                          T:          47  QT:         344  QTc:        455    Interpretive Statements  Sinus tachycardia  Right bundle branch block  Borderline ST elevation, anterior leads  No previous ECG available for comparison  Electronically Signed On 2022 23:48:13 PST by Js Baugh        All labs reviewed by me. Labs were compared to prior labs if they were available. Significant for leukocytosis of 13, likely reactionary, no anemia, hypokalemia otherwise normal electrolytes with a bicarb of 18 showing slight dehydration but normal renal function, normal liver enzymes, normal bilirubin    RADIOLOGY  CT-CTA LOWER EXT WITH & W/O-POST PROCESS RIGHT   Final Result         1.  Oblique mid shaft femoral diaphyseal fracture.   2.  No visualized arterial disruption, dissection, or aneurysm at the fracture site.   3.  Small caliber of the anterior tibial and peroneal arteries proximally which are not  "visualized in the distal segment with single vessel runoff at the ankle.   4.  Intramuscular fracture hematoma      These findings were discussed with the patient's clinician, Js Baugh, on 12/11/2022 11:52 PM.        The radiologist's interpretation of all radiological studies have been reviewed by me. I compared imaging to previous images, if they were available.     CONSCIOUS SEDATION PROCEDURE NOTE:  Patient identification was confirmed and patient consent was obtained verbally.  The procedure was conducted at 1:11 AM and performed by Dr. Baugh.  Anesthetic used: 160 mg of Propofol   Length of Time: 15 minutes  Other medications administered: None  Pre-procedure neuro examination revealed no deficits. Patient's airway remained patent, O2SAT adequate through procedure. Vitals remained stable. Patient tolerated procedure well without complications. Post-procedure exam showed patient w/o cranial deficits. Sensory and motor intact. Patient returned to baseline prior to disposition.  Instructions for care discussed verbally and pt provided with additional written instructions for homecare and f/u.    Fracture Reduction        Performed by: Js Baugh MD      Consent: Verbal consent obtained.      Risks and benefits: risks, benefits and alternatives were discussed      Consent given by: patient and/or guardian      Patient understanding: patient/guardian states understanding of the procedure being performed      Patient consent: the patient/guardian's understanding of the procedure matches consent given      Patient identity confirmed: verbally with patient and arm band      Time out: Immediately prior to procedure a \"time out\" was called to verify the correct patient, procedure, equipment, support staff and site/side marked as required.      Location details: Right femur      Reduction Procedure: Traction      Results: Post reduction alignement improved      Complication: Tolerated well without " "complication. Tendons intact and neurovascularly intact post procedure.    Splint Application and Check        Authorized by: Js Baugh       Consent: Verbal consent obtained.      Risks and benefits: risks, benefits and alternatives were discussed      Consent given by: patient      Patient understanding: patient states understanding of the procedure being performed      Patient consent: the patient's understanding of the procedure matches consent given      Patient identity confirmed: verbally with patient and arm band      Time out: Immediately prior to procedure a \"time out\" was called to verify the correct patient, procedure, equipment, support staff and site/side marked as required.      Location details: Right femur      Post-procedure: The splinted body part was neurovascularly unchanged following the procedure.      Patient tolerance: Patient tolerated the procedure well with no immediate complications.    COURSE & MEDICAL DECISION MAKING  Nursing notes, VS, PMSFHx, labs, imaging, EKG reviewed in chart.    MDM: 10:32 PM Julio Garrett is a 22 y.o. male who presented with femur deformity.  Patient was wrestling with friends after having a few alcoholic beverages and felt his femur \"snap\", right side, distal.  Arrives with obvious deformity to distal femur, placed in traction for comfort.  Patient's feet are both ice cold bilaterally and have difficulty palpating pulse in either foot, however he has normal capillary refill, sensation and motor are intact at this time I do not suspect necessarily acute arterial injury but he was sent for emergent CTA of the lower extremity.  CTA demonstrates obvious femur fracture but I am able to see contrast flow past the injury I do not suspect arterial injury at this time.  Radiologist read is consistent with this and shows no signs of acute venous or arterial injuries.  Patient's labs ordered for preoperative status, including EKG which was unremarkable.  " His CBC was within normal limits other than a likely reactionary leukocytosis, no anemia.  His CBC shows hypokalemia and slight dehydration which were repleted with 2 L of fluid and 20 mill equivalents of potassium.  The rest of his labs are unremarkable.  He required 2 doses of 25 mg each of ketamine to help with pain management which did adequately control his pain.  Case  discussed with orthopedic surgery who recommended admission to hospitalist, fracture reduction and splinting which was done, see procedure notes, under conscious sedation, and they will evaluate for surgical fixation in the morning.  Patient was placed is NPO.  Patient's friends at bedside help collaborate patient's story and provide further information.  Patient resting comfortably after discussion with hospitalist who will admit. Made NPO.  Pulses improved as a temperature of his feet improved here in the ED and there is an appropriate pedal pulse after splinting.    11:38 PM - After speaking with Orthopedics they recommended placing the patient in a knee immobilizer and to admit to the Hospitalist.      1:11 AM - Conscious sedation and joint reduction procedures performed by me as noted above.     FINAL IMPRESSION  1. Closed fracture of right femur, unspecified fracture morphology, unspecified portion of femur, initial encounter ([facility]) Acute   2. Hypokalemia Acute   3. Dehydration Acute   4. Alcohol abuse Acute   5.      Conscious sedation procedure performed by me  6.      Joint reduction procedure performed by me     Flaco BHAKTA (Antoinoibe), am scribing for, and in the presence of, Js Baugh.    Electronically signed by: Flaco Tobin (Alexandrea), 12/11/2022    Js BHAKTA personally performed the services described in this documentation, as scribed by Flaco Tobin in my presence, and it is both accurate and complete.    The note accurately reflects work and decisions made by me.  Js Baugh  12/11/2022  11:48  PM

## 2022-12-12 NOTE — ED NOTES
Back from CT.  RLE 2+ PT pulse, unable to find dp pulse. Cap refill less than 2 sec, foot cold to touch. Denies N/T

## 2022-12-12 NOTE — H&P
Hospital Medicine History & Physical Note    Date of Service  12/11/2022    Primary Care Physician  Pcp Pt States None    Consultants  orthopedics    Specialist Names: Dr. Tai     Code Status  Full Code    Chief Complaint  Chief Complaint   Patient presents with    T-5000 Extremity Pain     PT reports wrestling with friend and herd a loud pop and now with deformity to right upper thigh        History of Presenting Illness  Julio Garrett is a 22 y.o. male past medical history of gastritis who presented 12/11/2022 with right leg pain after wrestling with his friends.  He reports having intense leg pain after hearing a pop from his leg.  Describes the pain as 10 out of 10, sharp, exacerbated with any movement or weightbearing not really relieved with anything.  He does report vaping tobacco.  Denies any fever, chills, nausea or vomiting.    In the ER, CT of the right lower extremity revealed oblique midshaft femoral fracture.  Orthopedics consulted plan for or fixation in a.m.  Metabolic panel revealed hypokalemia and mild dehydration for which IV fluids were given.    I discussed the plan of care with patient.    Review of Systems  Review of Systems   Constitutional:  Negative for chills and fever.   HENT:  Negative for hearing loss and tinnitus.    Eyes:  Negative for blurred vision and double vision.   Respiratory:  Negative for cough and hemoptysis.    Cardiovascular:  Negative for chest pain and palpitations.   Gastrointestinal:  Negative for heartburn and nausea.   Genitourinary:  Negative for dysuria and urgency.   Musculoskeletal:  Positive for joint pain. Negative for myalgias and neck pain.   Skin:  Negative for rash.   Neurological:  Negative for dizziness and headaches.   Endo/Heme/Allergies:  Does not bruise/bleed easily.   Psychiatric/Behavioral:  Negative for depression and suicidal ideas.      Past Medical History  Gastritis     Surgical History  Reviewed and not pertinent     Family  History  Reviewed and not pertinent   Family history reviewed with patient. There is no family history that is pertinent to the chief complaint.     Social History   reports that he has never smoked. He has never used smokeless tobacco. He reports current alcohol use. He reports that he does not use drugs.    Allergies  No Known Allergies    Medications  Prior to Admission Medications   Prescriptions Last Dose Informant Patient Reported? Taking?   omeprazole (PRILOSEC) 20 MG delayed-release capsule   No No   Sig: Take 1 Cap by mouth every day.      Facility-Administered Medications: None       Physical Exam  Temp:  [36.4 °C (97.5 °F)] 36.4 °C (97.5 °F)  Pulse:  [] 111  Resp:  [16-20] 20  BP: (122-148)/(80-91) 148/81  SpO2:  [96 %-98 %] 98 %  Blood Pressure: (!) 148/81   Temperature: 36.4 °C (97.5 °F)   Pulse: (!) 111   Respiration: 20   Pulse Oximetry: 98 %       Physical Exam  Vitals and nursing note reviewed.   Constitutional:       Appearance: Normal appearance.      Comments: In moderate pain    HENT:      Head: Normocephalic and atraumatic.      Right Ear: Tympanic membrane normal.      Left Ear: Tympanic membrane normal.      Nose: Nose normal.      Mouth/Throat:      Mouth: Mucous membranes are dry.      Pharynx: Oropharynx is clear.   Eyes:      Extraocular Movements: Extraocular movements intact.      Pupils: Pupils are equal, round, and reactive to light.   Cardiovascular:      Rate and Rhythm: Regular rhythm. Tachycardia present.      Pulses: Normal pulses.      Heart sounds: Normal heart sounds.   Pulmonary:      Effort: Pulmonary effort is normal. No respiratory distress.      Breath sounds: Normal breath sounds. No stridor.   Abdominal:      General: Bowel sounds are normal. There is no distension.      Palpations: Abdomen is soft. There is no mass.   Musculoskeletal:         General: Tenderness present.      Cervical back: Neck supple.      Comments: RLE pain   Limited ROM   Knee immobilizer  being applied    Skin:     General: Skin is warm.      Capillary Refill: Capillary refill takes less than 2 seconds.      Coloration: Skin is not jaundiced or pale.   Neurological:      General: No focal deficit present.      Mental Status: He is alert and oriented to person, place, and time. Mental status is at baseline.      Cranial Nerves: No cranial nerve deficit.      Sensory: No sensory deficit.   Psychiatric:         Mood and Affect: Mood normal.         Behavior: Behavior normal.       Laboratory:  Recent Labs     12/11/22  2240   WBC 13.6*   RBC 4.88   HEMOGLOBIN 14.8   HEMATOCRIT 44.2   MCV 90.6   MCH 30.3   MCHC 33.5*   RDW 44.7   PLATELETCT 248   MPV 11.1     Recent Labs     12/11/22  2240   SODIUM 141   POTASSIUM 3.0*   CHLORIDE 103   CO2 18*   GLUCOSE 132*   BUN 12   CREATININE 0.70   CALCIUM 9.4     Recent Labs     12/11/22  2240   ALTSGPT 13   ASTSGOT 16   ALKPHOSPHAT 123*   TBILIRUBIN 0.3   GLUCOSE 132*         No results for input(s): NTPROBNP in the last 72 hours.      No results for input(s): TROPONINT in the last 72 hours.    Imaging:  CT-CTA LOWER EXT WITH & W/O-POST PROCESS RIGHT   Final Result         1.  Oblique mid shaft femoral diaphyseal fracture.   2.  No visualized arterial disruption, dissection, or aneurysm at the fracture site.   3.  Small caliber of the anterior tibial and peroneal arteries proximally which are not visualized in the distal segment with single vessel runoff at the ankle.   4.  Intramuscular fracture hematoma      These findings were discussed with the patient's clinician, Js Baugh, on 12/11/2022 11:52 PM.          EKG:  I have personally reviewed the images and compared with prior images.    Assessment/Plan:  Justification for Admission Status  I anticipate this patient will require at least two midnights for appropriate medical management, necessitating inpatient admission because right distal femoral fracture requiring OR fixation.    Patient will need a  Med/Surg bed on ORTHOPEDICS service .  The need is secondary to see above.    Closed fracture of distal end of right femur (HCC)  Assessment & Plan  CT showing midshaft femoral diaphyseal fracture  Orthopedics consulted-plan for OR in a.m.  N.p.o. midnight  Analgesics  PT/OT after OR intervention     High anion gap metabolic acidosis  Assessment & Plan  Likely secondary to alcohol consumption and dehydration    Gastritis  Assessment & Plan  C/w PPI    Tobacco dependence  Assessment & Plan  Vapes nicotine       Hypokalemia  Assessment & Plan  Replete   Check magnesium     Dehydration  Assessment & Plan  Likely secondary to alcohol consumption and dehydration  Resuscitated with 2 liters of IVF       VTE prophylaxis: SCDs/TEDs

## 2022-12-12 NOTE — ED TRIAGE NOTES
Chief Complaint   Patient presents with    T-5000 Extremity Pain     PT reports wrestling with friend and herd a loud pop and now with deformity to right upper thigh      Blood Pressure (Abnormal) 126/91   Pulse 94   Temperature 36.4 °C (97.5 °F) (Temporal)   Respiration 16   Height 1.829 m (6')   Weight 67.1 kg (148 lb)   Oxygen Saturation 96%   Body Mass Index 20.07 kg/m²

## 2022-12-12 NOTE — ED NOTES
Report from Arturo MACDONALD. Pt alert and oriented x4. Pt complaining of pain. Pt educated on POC.

## 2022-12-12 NOTE — ASSESSMENT & PLAN NOTE
Status post intramedullary nailing of right femur by Ortho  IV antibiotics for 24 hours  DVT prophylaxis  Mobilize   Weightbearing as tolerated  Analgesics

## 2022-12-12 NOTE — OP REPORT
DATE OF PROCEDURE: 12/12/2022    PREOPERATIVE DIAGNOSIS: Right femur fracture, closed, displaced, comminuted    POSTOPERATIVE DIAGNOSIS: Right femur fracture, closed, displaced, comminuted    PROCEDURE: Retrograde intramedullary nailing of right femur    SURGEON: Mert Pizano MD    ASSISTANT: Berto Vasquez MD    ANESTHESIA: Golden Us MD    ANESTHETIC: General    EBL: 50 cc    IMPLANTS:  mm x 10 mm ARFN with 3 interlocking screws    INDICATIONS: Julio is a 22-year-old man who was wrestling with a friend last night and felt a pop in his right thigh.  He was found to have a displaced femur fracture and was transferred to [facility] where he was admitted to the hospital.  Dr. Tai was consulted and is asked me to oversee the patient's orthopedic care.  I recommended intramedullary nailing.  I discussed the risk with the patient and these include bleeding, infection, neurovascular injury, pain, stiffness, nonunion, malunion, thromboembolic phenomenon, anesthetic and medical complications etc.    PROCEDURE DESCRIPTION: The patient was identified in the preoperative holding area and right leg was marked.  He was taken to the operating room and placed supine on the operating table.  Intravenous antibiotics were given.  General anesthesia was administered.  The right hindquarter was then prepped and draped in sterile fashion.  A timeout was held to confirm the patient's identity and correct surgical site.    Longitudinal incision was made over the anterior aspect of the right knee just distal to the patella.  The patellar tendon was split.  We then placed a guidewire retrograde into the distal femur with the appropriate starting point and trajectory.  Opening reamer was then used over the wire.  We then placed the ball-tipped guidewire up the medullary canal and across the fracture after manipulation up into the proximal femur.  We measured length.  We then reamed to 10 mm and 11.5 mm.   We then inserted the 440 mm x 10 mm retrograde nail.    The nail was locked distally with 2 lateral to medial interlocking screws after stab incision and predrilling.  The nail was then locked proximally with 1 single static interlocking screw from anterior to posterior after stab incision, blunt dissection to bone, and predrilling.  Insertion guide was removed.  Fluoroscopic images showed good reduction and implant placement.    We noted a lucent lesion eccentrically based along the lateral cortex at the level of the fracture likely that had benign appearance.    Fluoroscopic images were obtained at the femoral neck as well showing no fracture.  The knee was stable to ligamentous exam.  Wounds were irrigated and closed with 0 Vicryl, 2-0 Vicryl and staples.  Dressings were applied.  The drapes were removed and the patient's pelvis was level.  We noted approximately the leg lengths and symmetric rotation.  He was then extubated and taken to the recovery room in stable condition.    POSTOPERATIVE PLAN:     1.  Weightbearing as tolerated   2.  Intravenous antibiotics for 24 hours  3.  DVT prophylaxis with early mobilization, SCDs and Lovenox  4.  Radiographic follow-up of bone lesion

## 2022-12-12 NOTE — ED NOTES
0110: Time out called by ERP.  0112 30mg propofol administered  0113 30mg propofol  0115 30mg propofol  0115 10mg propofol  0116 30 mg propofol  0119 30 mg propofol   0120 ERP reducing right femur.   0122 Splint applied (posterior long)  0123 procedure complete  0125 pt awake and talking, breathing unassisted. VSS.

## 2022-12-12 NOTE — PROGRESS NOTES
Asked by Dr. Tai to oversee orthopaedic care  Closed right femur fx  Plan IMN right femur  Discussed with patient  Right leg marked

## 2022-12-12 NOTE — OR NURSING
Awake, alert and tolerating PO fluids.  Medicated for pain/shivering.  Dressings x3 clean, dry and intact.  Vitals stable.  On monitors with alarms audible.    Called brother with update.  Awaiting room assignment.

## 2022-12-12 NOTE — CONSULTS
12/12/2022    Time Called: 2200  Time Arrived: 0700      HPI: Julio Garrett is a 22 y.o. male who presents with right thigh pain after wrestling match.  He was wrestling with a friend when he noticed the sudden onset of pain and snapping sensation in his right thigh.  Initial images revealed midshaft femur fracture.  Patient denies any other injuries.    No past medical history on file.    No past surgical history on file.    Medications  No current facility-administered medications on file prior to encounter.     Current Outpatient Medications on File Prior to Encounter   Medication Sig Dispense Refill    omeprazole (PRILOSEC) 20 MG delayed-release capsule Take 1 Cap by mouth every day. 30 Cap 0       Allergies  Patient has no known allergies.    ROS  . All other systems were reviewed and found to be negative    No family history on file.    Social History     Socioeconomic History    Marital status: Single   Tobacco Use    Smoking status: Never    Smokeless tobacco: Never   Vaping Use    Vaping Use: Former    Quit date: 9/6/2020    Substances: Nicotine   Substance and Sexual Activity    Alcohol use: Yes    Drug use: No       Physical Exam  Vitals  /62   Pulse 70   Temp 36.4 °C (97.5 °F) (Temporal)   Resp 19   Ht 1.829 m (6')   Wt 67.1 kg (148 lb)   SpO2 95%   General: Well Developed, Well Nourished, Age appropriate appearance  HEENT: Normocephalic, atraumatic  Psych: Normal mood and affect  Neck: Supple, nontender, no masses  Lungs: Breathing unlabored, No audible wheezing  Heart: Regular heart rate and rhythm  Abdomen: Soft, NT, ND  Neuro: Sensation grossly intact to BUE and BLE, moving all four extremities  Skin: Intact, no open wounds  Vascular: Foot warm and well-perfused, Capillary refill <2 seconds  MSK: Right thigh compartments soft and compressible.  Pain with right lower extremity motion.  Sensation intact distally.      Radiographs:  CT-CTA LOWER EXT WITH & W/O-POST PROCESS RIGHT    Final Result         1.  Oblique mid shaft femoral diaphyseal fracture.   2.  No visualized arterial disruption, dissection, or aneurysm at the fracture site.   3.  Small caliber of the anterior tibial and peroneal arteries proximally which are not visualized in the distal segment with single vessel runoff at the ankle.   4.  Intramuscular fracture hematoma      These findings were discussed with the patient's clinician, Js Baugh, on 12/11/2022 11:52 PM.      DX-PORTABLE FLUORO > 1 HOUR    (Results Pending)   DX-FEMUR-2+ RIGHT    (Results Pending)   DX-FEMUR-2+ RIGHT    (Results Pending)   DX-CHEST-LIMITED (1 VIEW)    (Results Pending)       Laboratory Values  Recent Labs     12/11/22  2240   WBC 13.6*   RBC 4.88   HEMOGLOBIN 14.8   HEMATOCRIT 44.2   MCV 90.6   MCH 30.3   MCHC 33.5*   RDW 44.7   PLATELETCT 248   MPV 11.1     Recent Labs     12/11/22  2240 12/12/22  0629   SODIUM 141 139   POTASSIUM 3.0* 4.2   CHLORIDE 103 109   CO2 18* 21   GLUCOSE 132* 126*   BUN 12 6*             Impression: 22-year-old otherwise healthy male midshaft femur fracture.  Plan for OR today for intramedullary nail placement.  We discussed procedure at length.  Patient had a opportunity ask questions.    Plan:We discussed the diagnosis and findings with the patient at length.  We reviewed possible non operative and operative interventions and the risks and benefits of each of these.  he had a chance to ask questions and all of these were answered to his satisfaction. The patient chose to proceed with surgical intervention. Risks and benefits of surgery were discussed which include but are not limited to bleeding, infection, neurovascular damage, malunion, nonunion, instability, limb length discrepancy, DVT, PE, MI, Stroke and death. They understand these risks and wish to proceed.      Elier Tai MD  Orthopedic Trauma Surgery

## 2022-12-12 NOTE — ANESTHESIA POSTPROCEDURE EVALUATION
Patient: Julio Garrett    Procedure Summary     Date: 12/12/22 Room / Location: [place] [address] / SURGERY [facility]    Anesthesia Start: 0905 Anesthesia Stop: 1017    Procedure: INSERTION, INTRAMEDULLARY JEANMARIE, FEMUR, RETROGRADE (Right) Diagnosis: (MID SHAFT FEMUR FRACTURE)    Surgeons: Mert Pizano M.D. Responsible Provider: Golden Us M.D.    Anesthesia Type: general ASA Status: 2          Final Anesthesia Type: general  Last vitals  BP   Blood Pressure: 136/80    Temp   36.2 °C (97.2 °F)    Pulse   74   Resp   17    SpO2   100 %      Anesthesia Post Evaluation    Patient location during evaluation: PACU  Patient participation: complete - patient participated  Level of consciousness: awake and alert    Airway patency: patent  Anesthetic complications: no  Cardiovascular status: hemodynamically stable  Respiratory status: acceptable  Hydration status: euvolemic    PONV: none          No notable events documented.     Nurse Pain Score: 6 (NPRS)

## 2022-12-12 NOTE — ANESTHESIA PROCEDURE NOTES
Airway    Date/Time: 12/12/2022 9:12 AM  Performed by: Golden Us M.D.  Authorized by: Golden Us M.D.     Location:  OR  Urgency:  Elective  Indications for Airway Management:  Anesthesia      Spontaneous Ventilation: absent    Sedation Level:  Deep  Preoxygenated: Yes    Patient Position:  Sniffing  Final Airway Type:  Endotracheal airway  Final Endotracheal Airway:  ETT  Cuffed: Yes    Technique Used for Successful ETT Placement:  Direct laryngoscopy    Insertion Site:  Oral  Blade Type:  Curry  Laryngoscope Blade/Videolaryngoscope Blade Size:  3  ETT Size (mm):  7.0  Measured from:  Teeth  ETT to Teeth (cm):  25  Placement Verified by: auscultation and capnometry    Cormack-Lehane Classification:  Grade IIa - partial view of glottis  Number of Attempts at Approach:  1

## 2022-12-12 NOTE — ED NOTES
Med Rec complete per Pt at bedside.  Allergies reviewed.  Home Pharmacy:  [facility]

## 2022-12-12 NOTE — ANESTHESIA PREPROCEDURE EVALUATION
Case: [id number] Date/Time: 12/12/22 0853    Procedure: INSERTION, INTRAMEDULLARY JEANMARIE, FEMUR, RETROGRADE (Right)    Location: [facility] OR 16 / SURGERY [facility]    Surgeons: Mert Pizano M.D.      H/O gastritis o/w healthy    NPO    NKDA      Relevant Problems   No relevant active problems       Physical Exam    Airway   Mallampati: II  TM distance: >3 FB  Neck ROM: full       Cardiovascular - normal exam  Rhythm: regular  Rate: normal     Dental - normal exam           Pulmonary - normal exam     Abdominal    Neurological - normal exam                 Anesthesia Plan    ASA 2       Plan - general       Airway plan will be ETT          Induction: intravenous    Postoperative Plan: Postoperative administration of opioids is intended.    Pertinent diagnostic labs and testing reviewed    Informed Consent:    Anesthetic plan and risks discussed with patient.    Use of blood products discussed with: patient whom consented to blood products.

## 2022-12-12 NOTE — PROGRESS NOTES
Right femur fx   Pulse intact per ED  Admit for pain control  NPO at mn for OR tomorrow      Elier Tai MD  Orthopedic Trauma Surgery

## 2022-12-12 NOTE — OR NURSING
Transported to floor via gurney with transport.  On room air. RN notified that patient is on the way to the floor.    Family updated.

## 2022-12-13 LAB
ERYTHROCYTE [DISTWIDTH] IN BLOOD BY AUTOMATED COUNT: 46.4 FL (ref 35.9–50)
HCT VFR BLD AUTO: 34.7 % (ref 42–52)
HGB BLD-MCNC: 11.4 G/DL (ref 14–18)
MCH RBC QN AUTO: 30.7 PG (ref 27–33)
MCHC RBC AUTO-ENTMCNC: 32.9 G/DL (ref 33.7–35.3)
MCV RBC AUTO: 93.5 FL (ref 81.4–97.8)
PLATELET # BLD AUTO: 197 K/UL (ref 164–446)
PMV BLD AUTO: 10.9 FL (ref 9–12.9)
RBC # BLD AUTO: 3.71 M/UL (ref 4.7–6.1)
WBC # BLD AUTO: 14.7 K/UL (ref 4.8–10.8)

## 2022-12-13 PROCEDURE — 99024 POSTOP FOLLOW-UP VISIT: CPT | Performed by: ORTHOPAEDIC SURGERY

## 2022-12-13 PROCEDURE — 97161 PT EVAL LOW COMPLEX 20 MIN: CPT

## 2022-12-13 PROCEDURE — 97165 OT EVAL LOW COMPLEX 30 MIN: CPT

## 2022-12-13 PROCEDURE — A9270 NON-COVERED ITEM OR SERVICE: HCPCS | Performed by: STUDENT IN AN ORGANIZED HEALTH CARE EDUCATION/TRAINING PROGRAM

## 2022-12-13 PROCEDURE — 97535 SELF CARE MNGMENT TRAINING: CPT

## 2022-12-13 PROCEDURE — 700111 HCHG RX REV CODE 636 W/ 250 OVERRIDE (IP): Performed by: ORTHOPAEDIC SURGERY

## 2022-12-13 PROCEDURE — 700102 HCHG RX REV CODE 250 W/ 637 OVERRIDE(OP): Performed by: INTERNAL MEDICINE

## 2022-12-13 PROCEDURE — 85027 COMPLETE CBC AUTOMATED: CPT

## 2022-12-13 PROCEDURE — 770001 HCHG ROOM/CARE - MED/SURG/GYN PRIV*

## 2022-12-13 PROCEDURE — 99231 SBSQ HOSP IP/OBS SF/LOW 25: CPT | Performed by: INTERNAL MEDICINE

## 2022-12-13 PROCEDURE — 700102 HCHG RX REV CODE 250 W/ 637 OVERRIDE(OP): Performed by: STUDENT IN AN ORGANIZED HEALTH CARE EDUCATION/TRAINING PROGRAM

## 2022-12-13 PROCEDURE — A9270 NON-COVERED ITEM OR SERVICE: HCPCS | Performed by: INTERNAL MEDICINE

## 2022-12-13 RX ORDER — METHOCARBAMOL 500 MG/1
500 TABLET, FILM COATED ORAL 4 TIMES DAILY
Status: DISCONTINUED | OUTPATIENT
Start: 2022-12-13 | End: 2022-12-14 | Stop reason: HOSPADM

## 2022-12-13 RX ORDER — OXYCODONE HYDROCHLORIDE 5 MG/1
5 TABLET ORAL EVERY 6 HOURS PRN
Qty: 20 TABLET | Refills: 0 | Status: SHIPPED | OUTPATIENT
Start: 2022-12-13 | End: 2022-12-18

## 2022-12-13 RX ORDER — METHOCARBAMOL 500 MG/1
500 TABLET, FILM COATED ORAL 3 TIMES DAILY
Qty: 30 TABLET | Refills: 0 | Status: SHIPPED | OUTPATIENT
Start: 2022-12-13 | End: 2022-12-23

## 2022-12-13 RX ORDER — LIDOCAINE 50 MG/G
1 PATCH TOPICAL EVERY 24 HOURS
Status: DISCONTINUED | OUTPATIENT
Start: 2022-12-13 | End: 2022-12-14 | Stop reason: HOSPADM

## 2022-12-13 RX ADMIN — ACETAMINOPHEN 1000 MG: 500 TABLET ORAL at 23:16

## 2022-12-13 RX ADMIN — OXYCODONE 5 MG: 5 TABLET ORAL at 03:25

## 2022-12-13 RX ADMIN — ACETAMINOPHEN 1000 MG: 500 TABLET ORAL at 00:00

## 2022-12-13 RX ADMIN — ENOXAPARIN SODIUM 40 MG: 40 INJECTION SUBCUTANEOUS at 06:25

## 2022-12-13 RX ADMIN — ACETAMINOPHEN 1000 MG: 500 TABLET ORAL at 18:36

## 2022-12-13 RX ADMIN — OMEPRAZOLE 20 MG: 20 CAPSULE, DELAYED RELEASE ORAL at 06:24

## 2022-12-13 RX ADMIN — METHOCARBAMOL 500 MG: 500 TABLET ORAL at 18:36

## 2022-12-13 RX ADMIN — CEFAZOLIN SODIUM 1 G: 1 INJECTION, SOLUTION INTRAVENOUS at 09:17

## 2022-12-13 RX ADMIN — OXYCODONE HYDROCHLORIDE 10 MG: 10 TABLET ORAL at 14:33

## 2022-12-13 RX ADMIN — METHOCARBAMOL 500 MG: 500 TABLET ORAL at 12:44

## 2022-12-13 RX ADMIN — OXYCODONE HYDROCHLORIDE 10 MG: 10 TABLET ORAL at 19:57

## 2022-12-13 RX ADMIN — METHOCARBAMOL 500 MG: 500 TABLET ORAL at 23:17

## 2022-12-13 RX ADMIN — ACETAMINOPHEN 1000 MG: 500 TABLET ORAL at 06:24

## 2022-12-13 ASSESSMENT — ENCOUNTER SYMPTOMS
HEADACHES: 0
DOUBLE VISION: 0
PALPITATIONS: 0
MYALGIAS: 0
ABDOMINAL PAIN: 0
BRUISES/BLEEDS EASILY: 0
NAUSEA: 0
DEPRESSION: 0
BLURRED VISION: 0
DIZZINESS: 0
DIARRHEA: 0
ROS GI COMMENTS: POOR APPETITE
FALLS: 1
CHILLS: 0
FEVER: 0
COUGH: 0
HEMOPTYSIS: 0
VOMITING: 0
NECK PAIN: 0

## 2022-12-13 ASSESSMENT — COGNITIVE AND FUNCTIONAL STATUS - GENERAL
MOVING TO AND FROM BED TO CHAIR: A LOT
DAILY ACTIVITIY SCORE: 20
SUGGESTED CMS G CODE MODIFIER DAILY ACTIVITY: CJ
TURNING FROM BACK TO SIDE WHILE IN FLAT BAD: A LOT
HELP NEEDED FOR BATHING: A LITTLE
CLIMB 3 TO 5 STEPS WITH RAILING: A LOT
TOILETING: A LITTLE
STANDING UP FROM CHAIR USING ARMS: A LITTLE
SUGGESTED CMS G CODE MODIFIER MOBILITY: CL
MOBILITY SCORE: 14
DRESSING REGULAR LOWER BODY CLOTHING: A LOT
WALKING IN HOSPITAL ROOM: A LITTLE
MOVING FROM LYING ON BACK TO SITTING ON SIDE OF FLAT BED: A LOT

## 2022-12-13 ASSESSMENT — PAIN DESCRIPTION - PAIN TYPE
TYPE: ACUTE PAIN
TYPE: SURGICAL PAIN
TYPE: SURGICAL PAIN

## 2022-12-13 ASSESSMENT — GAIT ASSESSMENTS
DISTANCE (FEET): 15
DISTANCE (FEET): 15
DEVIATION: STEP TO;SHUFFLED GAIT;DECREASED HEEL STRIKE;DECREASED TOE OFF
ASSISTIVE DEVICE: FRONT WHEEL WALKER;CRUTCHES
GAIT LEVEL OF ASSIST: CONTACT GUARD ASSIST

## 2022-12-13 ASSESSMENT — ACTIVITIES OF DAILY LIVING (ADL): TOILETING: INDEPENDENT

## 2022-12-13 NOTE — FACE TO FACE
Face to Face Note  -  Durable Medical Equipment    Andrews Jackson D.O. - NPI: [id number]  I certify that this patient is under my care and that they had a durable medical equipment(DME)face to face encounter by myself that meets the physician DME face-to-face encounter requirements with this patient on:    Date of encounter:   Patient:                    MRN:                       YOB: 2020  Julio King Gerry  [id number]  2000     The encounter with the patient was in whole, or in part, for the following medical condition, which is the primary reason for durable medical equipment:  Other - femur fracture    I certify that, based on my findings, the following durable medical equipment is medically necessary:    Walkers.    My Clinical findings support the need for the above equipment due to:  Abnormal Gait

## 2022-12-13 NOTE — PROGRESS NOTES
POD#1  Moderate pain  No SCDs on right leg  +DF/PF  AVSS  Hct 34.7    Plan:    WBAT RLE  DVT proph  D/c planning  Opioid risks/alternatives/plan discussed

## 2022-12-13 NOTE — CARE PLAN
The patient is Stable - Low risk of patient condition declining or worsening    Shift Goals  Clinical Goals: monitor surgical site  Patient Goals: rest    Progress made toward(s) clinical / shift goals:      Patient is not progressing towards the following goals:

## 2022-12-13 NOTE — CARE PLAN
Problem: Pain - Standard  Goal: Alleviation of pain or a reduction in pain to the patient’s comfort goal  Outcome: Progressing     Problem: Fall Risk  Goal: Patient will remain free from falls  Outcome: Progressing   The patient is Stable - Low risk of patient condition declining or worsening    Shift Goals  Clinical Goals: monitor surgical site  Patient Goals: pain control    Progress made toward(s) clinical / shift goals:  Pt educated about plan of care. Medications explained to patient. Pt educated about using the pain scale. Pt educated about asking for pain medication as needed.     Patient is not progressing towards the following goals:

## 2022-12-13 NOTE — PROGRESS NOTES
Hospital Medicine Daily Progress Note    Date of Service  12/12/2022    Chief Complaint  Julio Garrett is a 22 y.o. male admitted 12/11/2022 with right leg pain.    Hospital Course  22 y.o. male past medical history of gastritis who presented 12/11/2022 with right leg pain after wrestling with his friends.  He reports having intense leg pain after hearing a pop from his leg.  Describes the pain as 10 out of 10, sharp, exacerbated with any movement or weightbearing not really relieved with anything.  He does report vaping tobacco.  Denies any fever, chills, nausea or vomiting.     In the ER, CT of the right lower extremity revealed oblique midshaft femoral fracture.  Orthopedics consulted plan for or fixation in a.m.  Metabolic panel revealed hypokalemia and mild dehydration for which IV fluids were given.    Interval Problem Update  12/12/2022-  Patient is status post intramedullary nailing of right femur today.  Postop pain well controlled still reports 4-5/10 pain.   -IV antibiotics for 24 hours  -Continue with DVT prophylaxis  -WBAT  -Tolerating PO diet     I have discussed this patient's plan of care and discharge plan at IDT rounds today with Case Management, Nursing, Nursing leadership, and other members of the IDT team.    Consultants/Specialty  orthopedics    Code Status  Full Code    Disposition  Patient is not medically cleared for discharge.   Anticipate discharge to to home with close outpatient follow-up.  I have placed the appropriate orders for post-discharge needs.    Review of Systems  Review of Systems   Constitutional:  Negative for chills and fever.   HENT:  Negative for hearing loss and tinnitus.    Eyes:  Negative for blurred vision and double vision.   Respiratory:  Negative for cough and hemoptysis.    Cardiovascular:  Negative for chest pain and palpitations.   Gastrointestinal:  Negative for heartburn and nausea.   Genitourinary:  Negative for dysuria and urgency.    Musculoskeletal:  Positive for joint pain. Negative for myalgias and neck pain.   Skin:  Negative for rash.   Neurological:  Negative for dizziness and headaches.   Endo/Heme/Allergies:  Does not bruise/bleed easily.   Psychiatric/Behavioral:  Negative for depression and suicidal ideas.       Physical Exam  Temp:  [36.2 °C (97.2 °F)-36.8 °C (98.2 °F)] 36.4 °C (97.6 °F)  Pulse:  [] 71  Resp:  [12-23] 16  BP: (119-157)/(59-91) 120/73  SpO2:  [92 %-100 %] 92 %    Physical Exam  Vitals and nursing note reviewed.   Constitutional:       General: He is not in acute distress.     Appearance: Normal appearance. He is not ill-appearing.   HENT:      Head: Normocephalic and atraumatic.      Right Ear: Tympanic membrane normal.      Left Ear: Tympanic membrane normal.      Nose: Nose normal.      Mouth/Throat:      Mouth: Mucous membranes are moist.      Pharynx: Oropharynx is clear.   Eyes:      Extraocular Movements: Extraocular movements intact.      Pupils: Pupils are equal, round, and reactive to light.   Cardiovascular:      Rate and Rhythm: Normal rate and regular rhythm.      Pulses: Normal pulses.      Heart sounds: Normal heart sounds.   Pulmonary:      Effort: Pulmonary effort is normal. No respiratory distress.      Breath sounds: Normal breath sounds. No stridor.   Abdominal:      General: Bowel sounds are normal. There is no distension.      Palpations: Abdomen is soft. There is no mass.   Musculoskeletal:         General: Deformity present. No swelling or tenderness.      Cervical back: Neck supple. No rigidity or tenderness.      Comments: Right lower extremity incisions dressed    Skin:     General: Skin is warm.      Capillary Refill: Capillary refill takes less than 2 seconds.      Coloration: Skin is not jaundiced or pale.   Neurological:      General: No focal deficit present.      Mental Status: He is alert and oriented to person, place, and time. Mental status is at baseline.      Cranial Nerves:  No cranial nerve deficit.      Sensory: No sensory deficit.   Psychiatric:         Mood and Affect: Mood normal.         Behavior: Behavior normal.       Fluids    Intake/Output Summary (Last 24 hours) at 12/12/2022 2049  Last data filed at 12/12/2022 1300  Gross per 24 hour   Intake 1139.24 ml   Output 300 ml   Net 839.24 ml       Laboratory  Recent Labs     12/11/22  2240   WBC 13.6*   RBC 4.88   HEMOGLOBIN 14.8   HEMATOCRIT 44.2   MCV 90.6   MCH 30.3   MCHC 33.5*   RDW 44.7   PLATELETCT 248   MPV 11.1     Recent Labs     12/11/22  2240 12/12/22  0629   SODIUM 141 139   POTASSIUM 3.0* 4.2   CHLORIDE 103 109   CO2 18* 21   GLUCOSE 132* 126*   BUN 12 6*   CREATININE 0.70 0.51   CALCIUM 9.4 8.6                   Imaging  DX-PORTABLE FLUORO > 1 HOUR   Preliminary Result      Portable fluoroscopy utilized for 1 minute 12 seconds.         INTERPRETING LOCATION: [address] [place] [address], [id number]      DX-FEMUR-2+ RIGHT   Final Result      ORIF right mid shaft femur fracture with anatomic alignment.      DX-CHEST-LIMITED (1 VIEW)   Final Result      No acute cardiopulmonary disease.      DX-FEMUR-2+ RIGHT   Final Result      Right mid shaft femur fracture.      CT-CTA LOWER EXT WITH & W/O-POST PROCESS RIGHT   Final Result         1.  Oblique mid shaft femoral diaphyseal fracture.   2.  No visualized arterial disruption, dissection, or aneurysm at the fracture site.   3.  Small caliber of the anterior tibial and peroneal arteries proximally which are not visualized in the distal segment with single vessel runoff at the ankle.   4.  Intramuscular fracture hematoma      These findings were discussed with the patient's clinician, Js Baugh, on 12/11/2022 11:52 PM.           Assessment/Plan  * Closed fracture of distal end of right femur (HCC)  Assessment & Plan  Status post intramedullary nailing of right femur by Ortho  IV antibiotics for 24 hours  DVT prophylaxis  Mobilize   Weightbearing as  tolerated  Analgesics    High anion gap metabolic acidosis  Assessment & Plan  Likely secondary to alcohol consumption and dehydration.  Resolved    Gastritis  Assessment & Plan  C/w PPI    Tobacco dependence  Assessment & Plan  Vapes nicotine       Hypokalemia  Assessment & Plan  Resolved       VTE prophylaxis: SCDs/TEDs    I have performed a physical exam and reviewed and updated ROS and Plan today (12/12/2022). In review of yesterday's note (12/11/2022), there are no changes except as documented above.

## 2022-12-13 NOTE — THERAPY
Occupational Therapy   Initial Evaluation     Patient Name: Julio Garrett  Age:  22 y.o., Sex:  male  Medical Record #: [id number]  Today's Date: 12/13/2022     Precautions  Precautions: (P) Fall Risk, Weight Bearing As Tolerated Right Lower Extremity    Assessment  Patient is a 22 y.o. male with a diagnosis of distal femur fx after feeling a pop while wrestling, is now s/p IMN on 12/12/2022.  Additional factors influencing patient status / progress: PMHx includes gastritis. During OT eval, pt was primarily limited by pain which improved with activity. Educated pt on using his LLE as an assist for bed mob but pt was too limited by pain and required assist to advance the RLE. Required assist to thread pants through his LE s but able to pull up over hips standing with FWW and SBA. Ambulated into bathroom with FWW and CGA-SBA, demo'd toilet txfer with initial CGA to sit then SBA to stand. Educated pt and his family on recommendation to use a TTB due to limited weightbearing due to pain. Demo'd TTB txfer sequence, pt declined trialing stating he understands the method and is comfortable having his family assist. Pt's mom reports a family member will be with him at all times initially and they are able to provide level of care pt currently requires. At this time, pt has no further acute OT needs. Defer to PT for further recommendations for mobility DME and post-acute services.       Plan    Recommend Occupational Therapy for Evaluation only for the following treatments    DC Equipment Recommendations: (P) Front-Wheel Walker, Tub Transfer Bench  Discharge Recommendations: (P) Anticipate that the patient will have no further occupational therapy needs after discharge from the hospital     Objective     12/13/22 0954   Total Time Spent   OT Evaluation (Minutes) 25   Prior Living Situation   Prior Services Home-Independent   Housing / Facility [address]   Steps Into Home   (ramp entry)   Steps In Home 0   Bathroom  Set up Bathtub / Shower Combination;Grab Bars   Equipment Owned Grab Bar(s) In Tub / Shower;Hand Held Shower   Lives with - Patient's Self Care Capacity Parents   Prior Level of ADL Function   Self Feeding Independent   Grooming / Hygiene Independent   Bathing Independent   Dressing Independent   Toileting Independent   Prior Level of IADL Function   Laundry Independent   Kitchen Mobility Independent   Finances Independent   Home Management Independent   Shopping Independent   Prior Level Of Mobility Independent Without Device in Community   Driving / Transportation Driving Independent   Occupation (Pre-Hospital Vocational) Employed Full Time   Precautions   Precautions Fall Risk;Weight Bearing As Tolerated Right Lower Extremity   Cognition    Level of Consciousness Alert   Strength Upper Body   Comments WFL to offload RLE weight with FWW   Upper Body Muscle Tone   Upper Body Muscle Tone  WDL   Neurological Concerns   Neurological Concerns No   Balance Assessment   Sitting Balance (Static) Good   Sitting Balance (Dynamic) Good   Standing Balance (Static) Fair   Standing Balance (Dynamic) Fair -   Weight Shift Sitting Fair   Weight Shift Standing Poor  (limited by pain)   Bed Mobility    Supine to Sit Minimal Assist  (assist for RLE advancement)   Sit to Supine   (NT - pt up in chair at end of session)   Scooting Supervised   ADL Assessment   Lower Body Dressing Moderate Assist  (assist to thread LEs through, able to pull pants up. Pt's family demo's ability to assist)   Toileting Contact Guard Assist   How much help from another person does the patient currently need...   Putting on and taking off regular lower body clothing? 2   Bathing (including washing, rinsing, and drying)? 3   Toileting, which includes using a toilet, bedpan, or urinal? 3   Putting on and taking off regular upper body clothing? 4   Taking care of personal grooming such as brushing teeth? 4   Eating meals? 4   6 Clicks Daily Activity Score 20    Functional Mobility   Sit to Stand Contact Guard Assist   Bed, Chair, Wheelchair Transfer Contact Guard Assist   Toilet Transfers Contact Guard Assist   Transfer Method Stand Step   Mobility in room EOB <> bathroom with FWW and CGA-SBA   Distance (Feet) 15   # of Times Distance was Traveled 2   Activity Tolerance   Sitting in Chair up in chair at end of session   Sitting Edge of Bed 5 mins   Standing 5 mins   Education Group   Education Provided Home Safety;Transfers;Role of Occupational Therapist;Activities of Daily Living;Adaptive Equipment;Weight Bearing Precautions   Role of Occupational Therapist Patient Response Patient;Family;Acceptance;Explanation;Verbal Demonstration   Home Safety Patient Response Patient;Family;Acceptance;Explanation;Demonstration;Verbal Demonstration   Transfers Patient Response Patient;Family;Acceptance;Explanation;Demonstration;Verbal Demonstration;Action Demonstration   ADL Patient Response Patient;Family;Acceptance;Explanation;Demonstration;Verbal Demonstration;Action Demonstration   Adaptive Equipment Patient Response Patient;Family;Acceptance;Explanation;Demonstration;Verbal Demonstration   Weight Bearing Precautions Patient Response Patient;Family;Acceptance;Explanation;Verbal Demonstration   Problem List   Problem List Decreased Active Daily Living Skills;Decreased Homemaking Skills;Decreased Functional Mobility   Anticipated Discharge Equipment and Recommendations   DC Equipment Recommendations Front-Wheel Walker;Tub Transfer Bench   Discharge Recommendations Anticipate that the patient will have no further occupational therapy needs after discharge from the hospital   Interdisciplinary Plan of Care Collaboration   IDT Collaboration with  Nursing;Family / Caregiver;Physical Therapist   Patient Position at End of Therapy Seated;Call Light within Reach;Tray Table within Reach;Phone within Reach;Family / Friend in Room   Collaboration Comments Pt's mom and brother in law present  and supportive throughout; discussed CLOF with PT

## 2022-12-13 NOTE — PROGRESS NOTES
[facility] Medicine Daily Progress Note    Date of Service  12/13/2022    Chief Complaint  Julio Garrett is a 22 y.o. male admitted 12/11/2022 with right leg pain.    Hospital Course  22 y.o. male past medical history of gastritis who presented 12/11/2022 with right leg pain after wrestling with his friends.  He reports having intense leg pain after hearing a pop from his leg.  Describes the pain as 10 out of 10, sharp, exacerbated with any movement or weightbearing not really relieved with anything.  He does report vaping tobacco.  Denies any fever, chills, nausea or vomiting.     In the ER, CT of the right lower extremity revealed oblique midshaft femoral fracture.  Orthopedics consulted plan for or fixation in a.m.  Metabolic panel revealed hypokalemia and mild dehydration for which IV fluids were given.    Interval Problem Update  Patient was seen and examined at bedside.  No acute events overnight. Patient is resting comfortably in bed and in no acute distress.     Pain control  - added robaxin, lidocaine patch  Possible discharge in AM  Patient had episode of vasovagal syncope this afternoon  - patient alert, answering questions appropriately, no bleeding at surgical site, lower extremity neurovascularly in tact    I have discussed this patient's plan of care and discharge plan at IDT rounds today with Case Management, Nursing, Nursing leadership, and other members of the IDT team.    Consultants/Specialty  orthopedics    Code Status  Full Code    Disposition  Patient is not medically cleared for discharge.   Anticipate discharge to to home with close outpatient follow-up.  I have placed the appropriate orders for post-discharge needs.    Review of Systems  Review of Systems   Constitutional:  Negative for chills and fever.   HENT:  Negative for hearing loss and tinnitus.    Eyes:  Negative for blurred vision and double vision.   Respiratory:  Negative for cough and hemoptysis.    Cardiovascular:   Negative for chest pain and palpitations.   Gastrointestinal:  Negative for abdominal pain, diarrhea, nausea and vomiting.        Poor appetite   Genitourinary:  Negative for dysuria and urgency.   Musculoskeletal:  Positive for falls and joint pain. Negative for myalgias and neck pain.   Skin:  Negative for rash.   Neurological:  Negative for dizziness and headaches.   Endo/Heme/Allergies:  Does not bruise/bleed easily.   Psychiatric/Behavioral:  Negative for depression and suicidal ideas.       Physical Exam  Temp:  [36.1 °C (97 °F)-36.8 °C (98.3 °F)] 36.8 °C (98.3 °F)  Pulse:  [71-81] 77  Resp:  [16] 16  BP: (102-130)/(54-73) 130/73  SpO2:  [98 %] 98 %    Physical Exam  Vitals and nursing note reviewed.   Constitutional:       General: He is not in acute distress.     Appearance: Normal appearance. He is not ill-appearing.   HENT:      Head: Normocephalic and atraumatic.      Right Ear: Tympanic membrane normal.      Left Ear: Tympanic membrane normal.      Nose: Nose normal. No congestion.      Mouth/Throat:      Mouth: Mucous membranes are moist.      Pharynx: No oropharyngeal exudate.   Eyes:      General: No scleral icterus.     Extraocular Movements: Extraocular movements intact.      Pupils: Pupils are equal, round, and reactive to light.   Cardiovascular:      Rate and Rhythm: Normal rate and regular rhythm.      Pulses: Normal pulses.      Heart sounds: Normal heart sounds.   Pulmonary:      Effort: Pulmonary effort is normal. No respiratory distress.      Breath sounds: Normal breath sounds. No wheezing.   Abdominal:      General: Bowel sounds are normal.      Palpations: Abdomen is soft.      Tenderness: There is no abdominal tenderness. There is no guarding or rebound.   Musculoskeletal:         General: No swelling, tenderness or deformity.      Cervical back: Neck supple. No rigidity or tenderness.      Comments: Right lower extremity surgical dressing c/d/I  Neurovascularly in tact   Skin:      General: Skin is warm.      Capillary Refill: Capillary refill takes less than 2 seconds.      Coloration: Skin is not jaundiced or pale.   Neurological:      General: No focal deficit present.      Mental Status: He is alert and oriented to person, place, and time.      Cranial Nerves: No cranial nerve deficit.      Sensory: No sensory deficit.   Psychiatric:         Mood and Affect: Mood normal.         Behavior: Behavior normal.       Fluids    Intake/Output Summary (Last 24 hours) at 12/13/2022 1515  Last data filed at 12/13/2022 0400  Gross per 24 hour   Intake --   Output 900 ml   Net -900 ml       Laboratory  Recent Labs     12/11/22  2240 12/13/22  0809   WBC 13.6* 14.7*   RBC 4.88 3.71*   HEMOGLOBIN 14.8 11.4*   HEMATOCRIT 44.2 34.7*   MCV 90.6 93.5   MCH 30.3 30.7   MCHC 33.5* 32.9*   RDW 44.7 46.4   PLATELETCT 248 197   MPV 11.1 10.9     Recent Labs     12/11/22 2240 12/12/22  0629   SODIUM 141 139   POTASSIUM 3.0* 4.2   CHLORIDE 103 109   CO2 18* 21   GLUCOSE 132* 126*   BUN 12 6*   CREATININE 0.70 0.51   CALCIUM 9.4 8.6                   Imaging  DX-PORTABLE FLUORO > 1 HOUR   Final Result      Portable fluoroscopy utilized for 1 minute 12 seconds.         INTERPRETING LOCATION: [address] [place] [address], [id number]      DX-FEMUR-2+ RIGHT   Final Result      ORIF right mid shaft femur fracture with anatomic alignment.      DX-CHEST-LIMITED (1 VIEW)   Final Result      No acute cardiopulmonary disease.      DX-FEMUR-2+ RIGHT   Final Result      Right mid shaft femur fracture.      CT-CTA LOWER EXT WITH & W/O-POST PROCESS RIGHT   Final Result         1.  Oblique mid shaft femoral diaphyseal fracture.   2.  No visualized arterial disruption, dissection, or aneurysm at the fracture site.   3.  Small caliber of the anterior tibial and peroneal arteries proximally which are not visualized in the distal segment with single vessel runoff at the ankle.   4.  Intramuscular fracture hematoma      These findings were  discussed with the patient's clinician, Js Baugh, on 12/11/2022 11:52 PM.           Assessment/Plan  * Closed fracture of distal end of right femur (HCC)  Assessment & Plan  Status post intramedullary nailing of right femur by Ortho  IV antibiotics for 24 hours  DVT prophylaxis  Mobilize   Weightbearing as tolerated  Analgesics    Gastritis  Assessment & Plan  C/w PPI    Tobacco dependence  Assessment & Plan  Tobacco cessation counseling and education provided for 4 minutes. Nicotine replacement options provided including patch, and further medical treatments including Wellbutrin and Chantix. As well as over the counter options of lozenges and gum.        Hypokalemia  Assessment & Plan  Resolved    High anion gap metabolic acidosis  Assessment & Plan  Likely secondary to alcohol consumption and dehydration.  Resolved       VTE prophylaxis: SCDs/TEDs    I have performed a physical exam and reviewed and updated ROS and Plan today (12/13/2022). In review of yesterday's note (12/12/2022), there are no changes except as documented above.

## 2022-12-13 NOTE — THERAPY
Physical Therapy   Initial Evaluation     Patient Name: Julio Garrett  Age:  22 y.o., Sex:  male  Medical Record #: [id number]  Today's Date: 12/13/2022     Precautions  Precautions: Fall Risk;Weight Bearing As Tolerated Right Lower Extremity    Assessment  Patient is 22 y.o. male presenting to physical therapy s/p retrograde intramedullary nailing of right femur; WBAT. Pt received sitting in bedside chair, notably uncomfortable. Trialed ambulation with FWW and with Cxs with pt demonstrating good balance with either AD; however, was able to tolerance and progress gradual WBing with FWW thus use of FWW recommended at this time to allow gradual WBing and progression of normalized gait. Pt has ramp access into home thus stair negotiation not warranted. Upon return BTB, pt educated on gentle ROM exercises to improve hip, knee and ankle ROM and reduce global R LE edema. Pt very gripped upon return BTB despite positioning, elevation of LE and ice. Pt reports that mm spasms are causing a lot of pain/discomfort and limiting his overall tolerance and ability to sleep- messaged MD if addition of mm relaxant is beneficial for pain management. PT will continue to follow to address impairments noted below.    From a PT standpoint, pt is functionally capable of DC home with family support WHEN PAIN IS ADEQUATELY MANAGED.      PT has placed orders for FWW.     Plan    Recommend Physical Therapy 5 times per week until therapy goals are met for the following treatments:  Bed Mobility, Equipment, Gait Training, Neuro Re-Education / Balance, Self Care/Home Evaluation, Stair Training, Therapeutic Activities, and Therapeutic Exercises    DC Equipment Recommendations: Front-Wheel Walker  Discharge Recommendations: Recommend outpatient physical therapy services to address higher level deficits; when medically cleared by Ortho MD       12/13/22 1220   Precautions   Precautions Fall Risk;Weight Bearing As Tolerated Right Lower  "Extremity   Vitals   O2 Delivery Device None - Room Air   Pain 0 - 10 Group   Therapist Pain Assessment Nurse Notified;9  (\"shivering\" due to pain from MM spasms leading to inc pain)   Prior Living Situation   Prior Services Home-Independent  (works as an )   Housing / Facility 1 [address]   Steps Into Home   (ramp entry)   Steps In Home 0   Equipment Owned Crutches;Wheelchair;Ramp   Lives with - Patient's Self Care Capacity Parents   Prior Level of Functional Mobility   Bed Mobility Independent   Transfer Status Independent   Ambulation Independent   Distance Ambulation (Feet)   (community)   Assistive Devices Used None   Stairs Independent   Cognition    Cognition / Consciousness WDL   Level of Consciousness Alert   Comments pain overriding learning retention re: goals for progressive WBing to tolerance, mobility, ROM exercises and ice/elevation   Passive ROM Lower Body   Passive ROM Lower Body X   Comments R LE limited by post op pain and edema- very guarded, L LE WFL   Active ROM Lower Body    Active ROM Lower Body  X   Comments as above   Strength Lower Body   Lower Body Strength  X   Comments trace quad set R LE due to pain, L LE WFL   Balance Assessment   Sitting Balance (Static) Good   Sitting Balance (Dynamic) Good   Standing Balance (Static) Fair   Standing Balance (Dynamic) Fair -   Weight Shift Sitting Good   Weight Shift Standing Fair   Comments above with B cxs or FWW. prefers NWB for pain management but allowed WBAT with FWW support.   Gait Analysis   Gait Level Of Assist Contact Guard Assist   Assistive Device Front Wheel Walker;Crutches   Distance (Feet) 15   # of Times Distance was Traveled 2  (1x with FWW, 1x with B cxs)   Deviation Step To;Shuffled Gait;Decreased Heel Strike;Decreased Toe Off  (poor WBing tolerance on R LE)   # of Stairs Climbed 0   Weight Bearing Status WBAT R LE   Comments distance limited by pain, was able to allow WBing when using FWW thus encouraged pt to use " FWW as primary AD at this time   Bed Mobility    Supine to Sit Minimal Assist  (seated in chair upon PT arrival)   Sit to Supine Supervised  (pt able to use L LE to assist R LE into bed)   Scooting Supervised   Functional Mobility   Sit to Stand Contact Guard Assist   Bed, Chair, Wheelchair Transfer Contact Guard Assist   Transfer Method Stand Step   How much difficulty does the patient currently have...   Turning over in bed (including adjusting bedclothes, sheets and blankets)? 2   Sitting down on and standing up from a chair with arms (e.g., wheelchair, bedside commode, etc.) 2   Moving from lying on back to sitting on the side of the bed? 2   How much help from another person does the patient currently need...   Moving to and from a bed to a chair (including a wheelchair)? 3   Need to walk in a hospital room? 3   Climbing 3-5 steps with a railing? 2   6 clicks Mobility Score 14   Activity Tolerance   Sitting Edge of Bed 5-7 min to rest throughout session   Standing 7 min total   Patient / Family Goals    Patient / Family Goal #1 to have pain managed   Short Term Goals    Short Term Goal # 1 pt will perform supine <> sit with HOB flat, no railing and SPV in 6 visits   Short Term Goal # 2 pt will perform sit <> stand and functional transfers with LRAD and SPV to improve mobility independence in 6 visits   Short Term Goal # 3 pt will ambulate > 100 ft with LRAD and SPV to access home environment in 6 visits   Short Term Goal # 4 pt will independently demonstrate understanding of ROM HEP to PT in 6 visit   Education Group   Education Provided Role of Physical Therapist;Exercises - Supine   Role of Physical Therapist Patient Response Patient;Acceptance;Explanation;Verbal Demonstration;Family   Exercises - Supine Patient Response Patient;Family;Acceptance;Explanation;Demonstration;Handout;Verbal Demonstration   Additional Comments educated on ice/elevation and goals for mobility while in house and upon DC home    Problem List    Problems Pain;Impaired Bed Mobility;Impaired Transfers;Impaired Ambulation;Impaired Balance;Functional Strength Deficit;Decreased Activity Tolerance   Anticipated Discharge Equipment and Recommendations   DC Equipment Recommendations Front-Wheel Walker   Discharge Recommendations Recommend outpatient physical therapy services to address higher level deficits   Interdisciplinary Plan of Care Collaboration   IDT Collaboration with  Nursing;Family / Caregiver;Occupational Therapist   Patient Position at End of Therapy In Bed;Call Light within Reach;Tray Table within Reach;Family / Friend in Room  (ice pack to R thigh)   Collaboration Comments aware of Eval and recs   Session Information   Date / Session Number  12/13- 1 (1/5, 12/19)   Priority 4  (DC home pending pain mangement)

## 2022-12-13 NOTE — PROGRESS NOTES
1450   Pt had a fall in bathroom while voiding . Vitals stable . Dr Jackson notified . Pt denies hitting his head , c/o dizziness when he fell .   Pt reeducated on the need to call for assistance to ambulate . Family present bedside when occurred , mom stated , he wanted to go by himself.     1500 Dr Jackson bedside for assessment . Pt stable .  Per Dr Jakcson disregard DC orders , will keep the pt another day to manage pain and assess . Family and pt educated on the need to call for assistance .

## 2022-12-14 VITALS
OXYGEN SATURATION: 99 % | TEMPERATURE: 97.3 F | HEART RATE: 94 BPM | SYSTOLIC BLOOD PRESSURE: 119 MMHG | HEIGHT: 72 IN | WEIGHT: 157.63 LBS | RESPIRATION RATE: 17 BRPM | DIASTOLIC BLOOD PRESSURE: 74 MMHG | BODY MASS INDEX: 21.35 KG/M2

## 2022-12-14 DIAGNOSIS — S72.044A CLOSED NONDISPLACED BASICERVICAL FRACTURE OF RIGHT FEMUR, INITIAL ENCOUNTER (HCC): ICD-10-CM

## 2022-12-14 PROCEDURE — 700102 HCHG RX REV CODE 250 W/ 637 OVERRIDE(OP): Performed by: STUDENT IN AN ORGANIZED HEALTH CARE EDUCATION/TRAINING PROGRAM

## 2022-12-14 PROCEDURE — 99239 HOSP IP/OBS DSCHRG MGMT >30: CPT | Performed by: INTERNAL MEDICINE

## 2022-12-14 PROCEDURE — 700102 HCHG RX REV CODE 250 W/ 637 OVERRIDE(OP): Performed by: INTERNAL MEDICINE

## 2022-12-14 PROCEDURE — A9270 NON-COVERED ITEM OR SERVICE: HCPCS | Performed by: STUDENT IN AN ORGANIZED HEALTH CARE EDUCATION/TRAINING PROGRAM

## 2022-12-14 PROCEDURE — A9270 NON-COVERED ITEM OR SERVICE: HCPCS | Performed by: INTERNAL MEDICINE

## 2022-12-14 PROCEDURE — 99024 POSTOP FOLLOW-UP VISIT: CPT | Performed by: ORTHOPAEDIC SURGERY

## 2022-12-14 PROCEDURE — 700111 HCHG RX REV CODE 636 W/ 250 OVERRIDE (IP): Performed by: ORTHOPAEDIC SURGERY

## 2022-12-14 RX ADMIN — ENOXAPARIN SODIUM 40 MG: 40 INJECTION SUBCUTANEOUS at 04:49

## 2022-12-14 RX ADMIN — ACETAMINOPHEN 1000 MG: 500 TABLET ORAL at 04:47

## 2022-12-14 RX ADMIN — OXYCODONE 5 MG: 5 TABLET ORAL at 09:37

## 2022-12-14 RX ADMIN — OXYCODONE 5 MG: 5 TABLET ORAL at 04:48

## 2022-12-14 RX ADMIN — OMEPRAZOLE 20 MG: 20 CAPSULE, DELAYED RELEASE ORAL at 04:47

## 2022-12-14 RX ADMIN — METHOCARBAMOL 500 MG: 500 TABLET ORAL at 09:37

## 2022-12-14 ASSESSMENT — PAIN DESCRIPTION - PAIN TYPE
TYPE: SURGICAL PAIN
TYPE: SURGICAL PAIN

## 2022-12-14 NOTE — PROGRESS NOTES
Attention order for fww pt was already issued a walker yesterday has it at the bedside to take home.

## 2022-12-14 NOTE — HOSPITAL COURSE
22 y.o. male past medical history of gastritis who presented 12/11/2022 with right leg pain after wrestling with his friends.  He reports having intense leg pain after hearing a pop from his leg.  Describes the pain as 10 out of 10, sharp, exacerbated with any movement or weightbearing not really relieved with anything.    CT of the right lower extremity revealed oblique midshaft femoral fracture.  Orthopedics consulted.  Underwent retrograde intramedullary nailing of right femur on 12/12. Patient tolerated procedure well. Patient required further hospitalization for pain management and control which was achieved. Patient was determined satisfactory for discharge with appropriate follow up.

## 2022-12-14 NOTE — PROGRESS NOTES
POD#2  Pain improved today  + DF/PF  AVSS  Mobilize  WBAT RLE with crutches/walker  DVT proph  D/c home soon

## 2022-12-14 NOTE — DISCHARGE SUMMARY
Pt needs FWW for home. Chose [facility], bedside RN to contact traction. No other discharge needs identified.

## 2022-12-14 NOTE — DISCHARGE SUMMARY
Discharge Summary    CHIEF COMPLAINT ON ADMISSION  Chief Complaint   Patient presents with    T-5000 Extremity Pain     PT reports wrestling with friend and herd a loud pop and now with deformity to right upper thigh        Reason for Admission  leg pain     Admission Date  12/11/2022    CODE STATUS  Prior    HPI & HOSPITAL COURSE  22 y.o. male past medical history of gastritis who presented 12/11/2022 with right leg pain after wrestling with his friends.  He reports having intense leg pain after hearing a pop from his leg.  Describes the pain as 10 out of 10, sharp, exacerbated with any movement or weightbearing not really relieved with anything.    CT of the right lower extremity revealed oblique midshaft femoral fracture.  Orthopedics consulted.  Underwent retrograde intramedullary nailing of right femur on 12/12. Patient tolerated procedure well. Patient required further hospitalization for pain management and control which was achieved. Patient was determined satisfactory for discharge with appropriate follow up.    Therefore, he is discharged in fair and stable condition to home with close outpatient follow-up.    The patient met 2-midnight criteria for an inpatient stay at the time of discharge.    Discharge Date  12/14/2022    FOLLOW UP ITEMS POST DISCHARGE  Please follow up with PCP in 3-5 days for post hospitalization follow up and medication reconciliation.     DISCHARGE DIAGNOSES  Principal Problem:    Closed fracture of distal end of right femur (HCC) POA: Unknown  Active Problems:    High anion gap metabolic acidosis POA: Unknown    Hypokalemia POA: Unknown    Tobacco dependence POA: Unknown    Gastritis POA: Unknown  Resolved Problems:    Other fracture of right femur, initial encounter for closed fracture (HCC) POA: Yes    Dehydration POA: Unknown      FOLLOW UP  No future appointments.  Mert Pizano M.D.  [address]  [facility] [address]  [phone]    Follow up in 11  day(s)  12/23/2022      MEDICATIONS ON DISCHARGE     Medication List        START taking these medications        Instructions   methocarbamol 500 MG Tabs  Commonly known as: ROBAXIN   Take 1 Tablet by mouth 3 times a day for 10 days.  Dose: 500 mg     oxyCODONE immediate-release 5 MG Tabs  Commonly known as: ROXICODONE   Take 1 Tablet by mouth every 6 hours as needed for Severe Pain for up to 5 days.  Dose: 5 mg            CONTINUE taking these medications        Instructions   omeprazole 20 MG delayed-release capsule  Commonly known as: PRILOSEC   Take 1 Cap by mouth every day.  Dose: 20 mg              Allergies  No Known Allergies    DIET  No orders of the defined types were placed in this encounter.      ACTIVITY  As tolerated.  Weight bearing as tolerated    CONSULTATIONS  Ortho    PROCEDURES  12/12/2022:  Retrograde intramedullary nailing of right femur    LABORATORY  Lab Results   Component Value Date    SODIUM 139 12/12/2022    POTASSIUM 4.2 12/12/2022    CHLORIDE 109 12/12/2022    CO2 21 12/12/2022    GLUCOSE 126 (H) 12/12/2022    BUN 6 (L) 12/12/2022    CREATININE 0.51 12/12/2022        Lab Results   Component Value Date    WBC 14.7 (H) 12/13/2022    HEMOGLOBIN 11.4 (L) 12/13/2022    HEMATOCRIT 34.7 (L) 12/13/2022    PLATELETCT 197 12/13/2022        Total time of the discharge process exceeds 42 minutes.

## 2022-12-14 NOTE — PROGRESS NOTES
Orthopaedic Progress Note    Interval changes:  Patient doing well post op  Dressings CDI  Cleared for DC by ortho pending medicine and therapy clearance    ROS - Patient denies any new issues.  Pain well controlled.    /73   Pulse 77   Temp 36.8 °C (98.3 °F) (Temporal)   Resp 16   Ht 1.829 m (6')   Wt 71.5 kg (157 lb 10.1 oz)   SpO2 98%       Patient seen and examined  No acute distress  Breathing non labored  RRR  RLE Surgical dressing is clean, dry, and intact. Patient clearly fires tibialis anterior, EHL, and gastrocnemius/soleus. Sensation is intact to light touch throughout superficial peroneal, deep peroneal, tibial, saphenous, and sural nerve distributions. Strong and palpable 2+ dorsalis pedis and posterior tibial pulses with capillary refill less than 2 seconds. No lower leg tenderness or discomfort.     Recent Labs     12/11/22  2240 12/13/22  0809   WBC 13.6* 14.7*   RBC 4.88 3.71*   HEMOGLOBIN 14.8 11.4*   HEMATOCRIT 44.2 34.7*   MCV 90.6 93.5   MCH 30.3 30.7   MCHC 33.5* 32.9*   RDW 44.7 46.4   PLATELETCT 248 197   MPV 11.1 10.9       Active Hospital Problems    Diagnosis     High anion gap metabolic acidosis [E87.29]     Hypokalemia [E87.6]     Closed fracture of distal end of right femur (HCC) [S72.401A]     Tobacco dependence [F17.200]     Gastritis [K29.70]        Assessment/Plan:  Patient doing well post op  Dressings CDI  Cleared for DC by ortho pending medicine and therapy clearance  POD#1 S/P Retrograde intramedullary nailing of right femur  Wt bearing status - WBAT  Wound care/Drains - Dressings to be changed every other day by nursing  Future Procedures - none planned   Lovenox: Start 12/13, Duration-until ambulatory > 150'  Sutures/Staples out- 14 days post operatively  PT/OT-initiated  Antibiotics: Perioperative completed  DVT Prophylaxis- TEDS/SCDs/Foot pumps  Savage-none  Case Coordination for Discharge Planning - Disposition home

## 2022-12-14 NOTE — CARE PLAN
The patient is Stable - Low risk of patient condition declining or worsening    Shift Goals  Clinical Goals: pain control and safety  Patient Goals: pain control and rest  Family Goals: not present    Progress made toward(s) clinical / shift goals:    Problem: Pain - Standard  Goal: Alleviation of pain or a reduction in pain to the patient’s comfort goal  Outcome: Progressing     Problem: Fall Risk  Goal: Patient will remain free from falls  Outcome: Progressing     Problem: Knowledge Deficit - Standard  Goal: Patient and family/care givers will demonstrate understanding of plan of care, disease process/condition, diagnostic tests and medications  Outcome: Progressing       Patient is not progressing towards the following goals:

## 2024-04-17 ENCOUNTER — APPOINTMENT (RX ONLY)
Dept: URBAN - METROPOLITAN AREA CLINIC 4 | Facility: CLINIC | Age: 24
Setting detail: DERMATOLOGY
End: 2024-04-17

## 2024-04-17 DIAGNOSIS — L70.0 ACNE VULGARIS: ICD-10-CM

## 2024-04-17 PROCEDURE — ? ORDER TESTS

## 2024-04-17 PROCEDURE — 99203 OFFICE O/P NEW LOW 30 MIN: CPT

## 2024-04-17 PROCEDURE — ? PHOTO-DOCUMENTATION

## 2024-04-17 PROCEDURE — ? ISOTRETINOIN INITIATION

## 2024-04-17 NOTE — PROCEDURE: ORDER TESTS
Performing Laboratory: 0
Billing Type: Third-Party Bill
Bill For Surgical Tray: no
Clinical Notes (To The Lab): Standing order every month for the next 1 year.
Expected Date Of Service: 04/17/2024
Both arterial and venous

## 2024-04-18 ENCOUNTER — HOSPITAL ENCOUNTER (OUTPATIENT)
Dept: LAB | Facility: MEDICAL CENTER | Age: 24
End: 2024-04-18
Attending: PHYSICIAN ASSISTANT
Payer: COMMERCIAL

## 2024-04-18 LAB
ALT SERPL-CCNC: 16 U/L (ref 2–50)
TRIGL SERPL-MCNC: 35 MG/DL (ref 0–149)

## 2024-04-18 PROCEDURE — 84460 ALANINE AMINO (ALT) (SGPT): CPT

## 2024-04-18 PROCEDURE — 84478 ASSAY OF TRIGLYCERIDES: CPT

## 2024-04-18 PROCEDURE — 36415 COLL VENOUS BLD VENIPUNCTURE: CPT

## 2024-04-22 ENCOUNTER — RX ONLY (OUTPATIENT)
Age: 24
Setting detail: RX ONLY
End: 2024-04-22

## 2024-04-22 RX ORDER — ISOTRETINOIN 20 MG/1
CAPSULE, LIQUID FILLED ORAL
Qty: 60 | Refills: 0 | Status: ERX | COMMUNITY
Start: 2024-04-22

## 2024-04-25 ENCOUNTER — RX ONLY (OUTPATIENT)
Age: 24
Setting detail: RX ONLY
End: 2024-04-25

## 2024-04-25 RX ORDER — ISOTRETINOIN 20 MG/1
CAPSULE, LIQUID FILLED ORAL
Qty: 60 | Refills: 0 | Status: ERX

## 2024-05-20 ENCOUNTER — HOSPITAL ENCOUNTER (OUTPATIENT)
Dept: LAB | Facility: MEDICAL CENTER | Age: 24
End: 2024-05-20
Attending: PHYSICIAN ASSISTANT
Payer: COMMERCIAL

## 2024-05-21 LAB
AST SERPL-CCNC: 22 U/L (ref 12–45)
TRIGL SERPL-MCNC: 52 MG/DL (ref 0–149)

## 2024-05-22 ENCOUNTER — RX ONLY (OUTPATIENT)
Age: 24
Setting detail: RX ONLY
End: 2024-05-22

## 2024-05-22 ENCOUNTER — APPOINTMENT (RX ONLY)
Dept: URBAN - METROPOLITAN AREA CLINIC 4 | Facility: CLINIC | Age: 24
Setting detail: DERMATOLOGY
End: 2024-05-22

## 2024-05-22 DIAGNOSIS — L70.0 ACNE VULGARIS: ICD-10-CM

## 2024-05-22 PROCEDURE — 99214 OFFICE O/P EST MOD 30 MIN: CPT

## 2024-05-22 PROCEDURE — ? PHOTO-DOCUMENTATION

## 2024-05-22 PROCEDURE — ? ORDER TESTS

## 2024-05-22 PROCEDURE — ? ISOTRETINOIN MONITORING

## 2024-05-22 RX ORDER — ISOTRETINOIN 30 MG/1
CAPSULE, LIQUID FILLED ORAL
Qty: 60 | Refills: 0 | Status: ERX | COMMUNITY
Start: 2024-05-22

## 2024-05-22 NOTE — PROCEDURE: ISOTRETINOIN MONITORING
Patient Weight (Optional But Required For Cumulative Dose-Numbers And Decimals Only): 145
Show Text Field For Brand Names Of Contraception?: Yes
Dosing Month 1 (Required For Cumulative Dosing): 20mg BID
Weight Units: pounds
Myalgia Treatment: I explained this is common when taking isotretinoin. If this worsens they will contact us. They may try OTC ibuprofen.
Nosebleeds Normal Treatment: I explained this is common when taking isotretinoin. I recommended saline mist in each nostril multiple times a day. If this worsens they will contact us.
Add High Risk Medication Management Associated Diagnosis?: No
Cheilitis Normal Treatment: I recommended application of Vaseline or Aquaphor numerous times a day (as often as every hour) and before going to bed.
Retinoid Dermatitis Normal Treatment: I recommended more frequent application of Cetaphil or CeraVe to the areas of dermatitis.
Hypercholesterolemia Monitoring: I explained this is common when taking isotretinoin. We will monitor closely.
Cheilitis Aggressive Treatment: I recommended application of Vaseline or Aquaphor numerous times a day (as often as every hour) and before going to bed. I also prescribed a topical steroid for twice daily use.
Dosing Month 2 (Required For Cumulative Dosing): 30mg BID
Months Of Therapy Completed: 1
Counseling Text: I reviewed the side effect in detail. Patient should get monthly blood tests, not donate blood, not drive at night if vision affected, and not share medication.
Xerosis Normal Treatment: I recommended application of Cetaphil or CeraVe numerous times a day and before going to bed to all dry areas.
Retinoid Dermatitis Aggressive Treatment: I recommended more frequent application of Cetaphil or CeraVe to the areas of dermatitis. I also prescribed a topical steroid for twice daily use until the dermatitis resolves.
Female Pregnancy Counseling Text: Female patients should also be on two forms of birth control while taking this medication and for one month after their last dose.
Xerosis Aggressive Treatment: I recommended application of Cetaphil or CeraVe numerous times a day and before going to bed to all dry areas. I also prescribed a topical steroid for twice daily use.
Headache Monitoring: I recommended monitoring the headaches for now. There is no evidence of increased intracranial pressure. They were instructed to call if the headaches are worsening.
Detail Level: Zone
Myalgia Monitoring: I explained this is common when taking isotretinoin. If this worsens they will contact us.
Use Therapeutic Ranged Or Therapeutic Target: please select Range or Target
Pounds Preamble Statement (Weight Entered In Details Tab): Reported Weight in pounds:
What Is The Patient's Gender: Male
Xerosis Normal Treatment: I recommended application of Cetaphil or CeraVe numerous times a day going to bed to all dry areas.
Kilograms Preamble Statement (Weight Entered In Details Tab): Reported Weight in kilograms:
Lower Range (In Mg/Kg): 120
Female Completion Statement: After discussing her treatment course we decided to discontinue isotretinoin therapy at this time. I explained that she would need to continue her birth control methods for at least one month after the last dosage. She should also get a pregnancy test one month after the last dose. She shouldn't donate blood for one month after the last dose. She should call with any new symptoms of depression.
Upper Range (In Mg/Kg): 150
Male Completion Statement: After discussing his treatment course we decided to discontinue isotretinoin therapy at this time. He shouldn't donate blood for one month after the last dose. He should call with any new symptoms of depression.
Next Month's Dosage: Continue Current Dosage
Xerosis Aggressive Treatment: I recommended application of Cetaphil or CeraVe numerous times a day going to bed to all dry areas. I also prescribed a topical steroid for twice daily use.
Target Cumulative Dosage (In Mg/Kg): 135

## 2024-05-22 NOTE — PROCEDURE: ORDER TESTS
Billing Type: Third-Party Bill
Bill For Surgical Tray: no
Clinical Notes (To The Lab): Standing order every month for the next 1 year.
Expected Date Of Service: 04/17/2024

## 2024-06-17 ENCOUNTER — HOSPITAL ENCOUNTER (OUTPATIENT)
Dept: LAB | Facility: MEDICAL CENTER | Age: 24
End: 2024-06-17
Attending: PHYSICIAN ASSISTANT
Payer: COMMERCIAL

## 2024-06-17 LAB
ALT SERPL-CCNC: 20 U/L (ref 2–50)
TRIGL SERPL-MCNC: 69 MG/DL (ref 0–149)

## 2024-06-17 PROCEDURE — 36415 COLL VENOUS BLD VENIPUNCTURE: CPT

## 2024-06-17 PROCEDURE — 84478 ASSAY OF TRIGLYCERIDES: CPT

## 2024-06-17 PROCEDURE — 84460 ALANINE AMINO (ALT) (SGPT): CPT

## 2024-06-19 ENCOUNTER — APPOINTMENT (RX ONLY)
Dept: URBAN - METROPOLITAN AREA CLINIC 4 | Facility: CLINIC | Age: 24
Setting detail: DERMATOLOGY
End: 2024-06-19

## 2024-06-19 DIAGNOSIS — L70.0 ACNE VULGARIS: ICD-10-CM

## 2024-06-19 PROCEDURE — ? ADDITIONAL NOTES

## 2024-06-19 PROCEDURE — ? ISOTRETINOIN MONITORING

## 2024-06-19 PROCEDURE — 99214 OFFICE O/P EST MOD 30 MIN: CPT

## 2024-06-19 PROCEDURE — ? PRESCRIPTION

## 2024-06-19 RX ORDER — ISOTRETINOIN 30 MG/1
CAPSULE, LIQUID FILLED ORAL QD
Qty: 60 | Refills: 0 | Status: ERX | COMMUNITY
Start: 2024-06-19

## 2024-06-19 RX ADMIN — ISOTRETINOIN: 30 CAPSULE, LIQUID FILLED ORAL at 00:00

## 2024-06-19 NOTE — PROCEDURE: ADDITIONAL NOTES
Detail Level: Simple
Render Risk Assessment In Note?: no
Additional Notes: Improved. No JAKOB. Continue isotretinoin

## 2024-06-19 NOTE — PROCEDURE: ISOTRETINOIN MONITORING
Patient Weight (Optional But Required For Cumulative Dose-Numbers And Decimals Only): 145
Show Text Field For Brand Names Of Contraception?: Yes
Dosing Month 1 (Required For Cumulative Dosing): 20mg BID
Weight Units: pounds
Myalgia Treatment: I explained this is common when taking isotretinoin. If this worsens they will contact us. They may try OTC ibuprofen.
Nosebleeds Normal Treatment: I explained this is common when taking isotretinoin. I recommended saline mist in each nostril multiple times a day. If this worsens they will contact us.
Add High Risk Medication Management Associated Diagnosis?: No
Cheilitis Normal Treatment: I recommended application of Vaseline or Aquaphor numerous times a day (as often as every hour) and before going to bed.
Retinoid Dermatitis Normal Treatment: I recommended more frequent application of Cetaphil or CeraVe to the areas of dermatitis.
Hypercholesterolemia Monitoring: I explained this is common when taking isotretinoin. We will monitor closely.
Cheilitis Aggressive Treatment: I recommended application of Vaseline or Aquaphor numerous times a day (as often as every hour) and before going to bed. I also prescribed a topical steroid for twice daily use.
Dosing Month 2 (Required For Cumulative Dosing): 30mg BID
Months Of Therapy Completed: 2
Counseling Text: I reviewed the side effect in detail. Patient should get monthly blood tests, not donate blood, not drive at night if vision affected, and not share medication.
Xerosis Normal Treatment: I recommended application of Cetaphil or CeraVe numerous times a day and before going to bed to all dry areas.
Retinoid Dermatitis Aggressive Treatment: I recommended more frequent application of Cetaphil or CeraVe to the areas of dermatitis. I also prescribed a topical steroid for twice daily use until the dermatitis resolves.
Female Pregnancy Counseling Text: Female patients should also be on two forms of birth control while taking this medication and for one month after their last dose.
Xerosis Aggressive Treatment: I recommended application of Cetaphil or CeraVe numerous times a day and before going to bed to all dry areas. I also prescribed a topical steroid for twice daily use.
Headache Monitoring: I recommended monitoring the headaches for now. There is no evidence of increased intracranial pressure. They were instructed to call if the headaches are worsening.
Detail Level: Zone
Myalgia Monitoring: I explained this is common when taking isotretinoin. If this worsens they will contact us.
Use Therapeutic Ranged Or Therapeutic Target: please select Range or Target
Pounds Preamble Statement (Weight Entered In Details Tab): Reported Weight in pounds:
What Is The Patient's Gender: Male
Xerosis Normal Treatment: I recommended application of Cetaphil or CeraVe numerous times a day going to bed to all dry areas.
Kilograms Preamble Statement (Weight Entered In Details Tab): Reported Weight in kilograms:
Lower Range (In Mg/Kg): 120
Female Completion Statement: After discussing her treatment course we decided to discontinue isotretinoin therapy at this time. I explained that she would need to continue her birth control methods for at least one month after the last dosage. She should also get a pregnancy test one month after the last dose. She shouldn't donate blood for one month after the last dose. She should call with any new symptoms of depression.
Upper Range (In Mg/Kg): 150
Male Completion Statement: After discussing his treatment course we decided to discontinue isotretinoin therapy at this time. He shouldn't donate blood for one month after the last dose. He should call with any new symptoms of depression.
Next Month's Dosage: Continue Current Dosage
Xerosis Aggressive Treatment: I recommended application of Cetaphil or CeraVe numerous times a day going to bed to all dry areas. I also prescribed a topical steroid for twice daily use.
Target Cumulative Dosage (In Mg/Kg): 135

## 2024-06-19 NOTE — HPI: ISOTRETINOIN FOLLOW UP (PATIENT REPORTED)
Where Is Your Acne Located?: Face chest back
When Was Isotretinoin Started?: April 2024
Your Weight In Pounds:: 155

## 2024-07-18 ENCOUNTER — HOSPITAL ENCOUNTER (OUTPATIENT)
Dept: LAB | Facility: MEDICAL CENTER | Age: 24
End: 2024-07-18
Attending: PHYSICIAN ASSISTANT
Payer: COMMERCIAL

## 2024-07-18 LAB
ALT SERPL-CCNC: 23 U/L (ref 2–50)
TRIGL SERPL-MCNC: 57 MG/DL (ref 0–149)

## 2024-07-18 PROCEDURE — 84478 ASSAY OF TRIGLYCERIDES: CPT

## 2024-07-18 PROCEDURE — 84460 ALANINE AMINO (ALT) (SGPT): CPT

## 2024-07-18 PROCEDURE — 36415 COLL VENOUS BLD VENIPUNCTURE: CPT

## 2024-07-22 ENCOUNTER — APPOINTMENT (RX ONLY)
Dept: URBAN - METROPOLITAN AREA CLINIC 4 | Facility: CLINIC | Age: 24
Setting detail: DERMATOLOGY
End: 2024-07-22

## 2024-07-22 DIAGNOSIS — L70.0 ACNE VULGARIS: ICD-10-CM

## 2024-07-22 PROCEDURE — ? PRESCRIPTION

## 2024-07-22 PROCEDURE — ? ADDITIONAL NOTES

## 2024-07-22 PROCEDURE — 99214 OFFICE O/P EST MOD 30 MIN: CPT

## 2024-07-22 PROCEDURE — ? ISOTRETINOIN MONITORING

## 2024-07-22 RX ORDER — ISOTRETINOIN 30 MG/1
CAPSULE, LIQUID FILLED ORAL QD
Qty: 60 | Refills: 0 | Status: ERX

## 2024-07-22 NOTE — PROCEDURE: ADDITIONAL NOTES
Detail Level: Simple
Render Risk Assessment In Note?: no
Additional Notes: Improved. Continue isotretinoin. Patient advised to use Miralax TIW. To contact me if this fails to resolve the issue.

## 2024-08-22 ENCOUNTER — APPOINTMENT (RX ONLY)
Dept: URBAN - METROPOLITAN AREA CLINIC 4 | Facility: CLINIC | Age: 24
Setting detail: DERMATOLOGY
End: 2024-08-22

## 2024-08-22 DIAGNOSIS — L70.0 ACNE VULGARIS: ICD-10-CM

## 2024-08-22 PROCEDURE — ? ISOTRETINOIN MONITORING

## 2024-08-22 PROCEDURE — ? PRESCRIPTION

## 2024-08-22 PROCEDURE — 99214 OFFICE O/P EST MOD 30 MIN: CPT

## 2024-08-22 PROCEDURE — ? ADDITIONAL NOTES

## 2024-08-22 RX ORDER — ISOTRETINOIN 30 MG/1
CAPSULE, LIQUID FILLED ORAL QD
Qty: 60 | Refills: 0 | Status: ERX

## 2024-08-22 NOTE — PROCEDURE: ISOTRETINOIN MONITORING
Patient Weight (Optional But Required For Cumulative Dose-Numbers And Decimals Only): 145
Show Text Field For Brand Names Of Contraception?: Yes
Dosing Month 1 (Required For Cumulative Dosing): 20mg BID
Weight Units: pounds
Myalgia Treatment: I explained this is common when taking isotretinoin. If this worsens they will contact us. They may try OTC ibuprofen.
Nosebleeds Normal Treatment: I explained this is common when taking isotretinoin. I recommended saline mist in each nostril multiple times a day. If this worsens they will contact us.
Add High Risk Medication Management Associated Diagnosis?: No
Cheilitis Normal Treatment: I recommended application of Vaseline or Aquaphor numerous times a day (as often as every hour) and before going to bed.
Retinoid Dermatitis Normal Treatment: I recommended more frequent application of Cetaphil or CeraVe to the areas of dermatitis.
Hypercholesterolemia Monitoring: I explained this is common when taking isotretinoin. We will monitor closely.
Cheilitis Aggressive Treatment: I recommended application of Vaseline or Aquaphor numerous times a day (as often as every hour) and before going to bed. I also prescribed a topical steroid for twice daily use.
Dosing Month 2 (Required For Cumulative Dosing): 30mg BID
Months Of Therapy Completed: 4
Counseling Text: I reviewed the side effect in detail. Patient should get monthly blood tests, not donate blood, not drive at night if vision affected, and not share medication.
Xerosis Normal Treatment: I recommended application of Cetaphil or CeraVe numerous times a day and before going to bed to all dry areas.
Retinoid Dermatitis Aggressive Treatment: I recommended more frequent application of Cetaphil or CeraVe to the areas of dermatitis. I also prescribed a topical steroid for twice daily use until the dermatitis resolves.
Female Pregnancy Counseling Text: Female patients should also be on two forms of birth control while taking this medication and for one month after their last dose.
Xerosis Aggressive Treatment: I recommended application of Cetaphil or CeraVe numerous times a day and before going to bed to all dry areas. I also prescribed a topical steroid for twice daily use.
Headache Monitoring: I recommended monitoring the headaches for now. There is no evidence of increased intracranial pressure. They were instructed to call if the headaches are worsening.
Detail Level: Zone
Myalgia Monitoring: I explained this is common when taking isotretinoin. If this worsens they will contact us.
Use Therapeutic Ranged Or Therapeutic Target: please select Range or Target
Pounds Preamble Statement (Weight Entered In Details Tab): Reported Weight in pounds:
What Is The Patient's Gender: Male
Xerosis Normal Treatment: I recommended application of Cetaphil or CeraVe numerous times a day going to bed to all dry areas.
Kilograms Preamble Statement (Weight Entered In Details Tab): Reported Weight in kilograms:
Lower Range (In Mg/Kg): 120
Female Completion Statement: After discussing her treatment course we decided to discontinue isotretinoin therapy at this time. I explained that she would need to continue her birth control methods for at least one month after the last dosage. She should also get a pregnancy test one month after the last dose. She shouldn't donate blood for one month after the last dose. She should call with any new symptoms of depression.
Upper Range (In Mg/Kg): 150
Male Completion Statement: After discussing his treatment course we decided to discontinue isotretinoin therapy at this time. He shouldn't donate blood for one month after the last dose. He should call with any new symptoms of depression.
Next Month's Dosage: Continue Current Dosage
Xerosis Aggressive Treatment: I recommended application of Cetaphil or CeraVe numerous times a day going to bed to all dry areas. I also prescribed a topical steroid for twice daily use.
Target Cumulative Dosage (In Mg/Kg): 135

## 2024-09-23 ENCOUNTER — APPOINTMENT (RX ONLY)
Dept: URBAN - METROPOLITAN AREA CLINIC 4 | Facility: CLINIC | Age: 24
Setting detail: DERMATOLOGY
End: 2024-09-23

## 2024-09-23 DIAGNOSIS — L70.0 ACNE VULGARIS: ICD-10-CM

## 2024-09-23 PROCEDURE — ? ADDITIONAL NOTES

## 2024-09-23 PROCEDURE — ? ISOTRETINOIN MONITORING

## 2024-09-23 PROCEDURE — 99214 OFFICE O/P EST MOD 30 MIN: CPT

## 2024-09-23 NOTE — HPI: ISOTRETINOIN FOLLOW UP (PATIENT REPORTED)
Where Is Your Acne Located?: Face
When Was Isotretinoin Started?: April 2024
Your Weight In Pounds:: 165

## 2024-09-23 NOTE — PROCEDURE: ADDITIONAL NOTES
Additional Notes: Will complete 1 more month of the medication. To contact me if this fails to resolve his acne. Discussed he cannot donate blood for 1 month and no cosmetic surgery for 6 months. To contact me if any ? or problems. 
Render Risk Assessment In Note?: no
Detail Level: Simple

## 2024-09-23 NOTE — PROCEDURE: ISOTRETINOIN MONITORING
Patient Weight (Optional But Required For Cumulative Dose-Numbers And Decimals Only): 165
Show Text Field For Brand Names Of Contraception?: Yes
Dosing Month 1 (Required For Cumulative Dosing): 20mg BID
Weight Units: pounds
Myalgia Treatment: I explained this is common when taking isotretinoin. If this worsens they will contact us. They may try OTC ibuprofen.
Nosebleeds Normal Treatment: I explained this is common when taking isotretinoin. I recommended saline mist in each nostril multiple times a day. If this worsens they will contact us.
Add High Risk Medication Management Associated Diagnosis?: No
Cheilitis Normal Treatment: I recommended application of Vaseline or Aquaphor numerous times a day (as often as every hour) and before going to bed.
Retinoid Dermatitis Normal Treatment: I recommended more frequent application of Cetaphil or CeraVe to the areas of dermatitis.
Hypercholesterolemia Monitoring: I explained this is common when taking isotretinoin. We will monitor closely.
Cheilitis Aggressive Treatment: I recommended application of Vaseline or Aquaphor numerous times a day (as often as every hour) and before going to bed. I also prescribed a topical steroid for twice daily use.
Dosing Month 2 (Required For Cumulative Dosing): 30mg BID
Months Of Therapy Completed: 5
Counseling Text: I reviewed the side effect in detail. Patient should get monthly blood tests, not donate blood, not drive at night if vision affected, and not share medication.
Xerosis Normal Treatment: I recommended application of Cetaphil or CeraVe numerous times a day and before going to bed to all dry areas.
Retinoid Dermatitis Aggressive Treatment: I recommended more frequent application of Cetaphil or CeraVe to the areas of dermatitis. I also prescribed a topical steroid for twice daily use until the dermatitis resolves.
Female Pregnancy Counseling Text: Female patients should also be on two forms of birth control while taking this medication and for one month after their last dose.
Xerosis Aggressive Treatment: I recommended application of Cetaphil or CeraVe numerous times a day and before going to bed to all dry areas. I also prescribed a topical steroid for twice daily use.
Headache Monitoring: I recommended monitoring the headaches for now. There is no evidence of increased intracranial pressure. They were instructed to call if the headaches are worsening.
Detail Level: Zone
Myalgia Monitoring: I explained this is common when taking isotretinoin. If this worsens they will contact us.
Use Therapeutic Ranged Or Therapeutic Target: please select Range or Target
Pounds Preamble Statement (Weight Entered In Details Tab): Reported Weight in pounds:
What Is The Patient's Gender: Male
Xerosis Normal Treatment: I recommended application of Cetaphil or CeraVe numerous times a day going to bed to all dry areas.
Kilograms Preamble Statement (Weight Entered In Details Tab): Reported Weight in kilograms:
Lower Range (In Mg/Kg): 120
Female Completion Statement: After discussing her treatment course we decided to discontinue isotretinoin therapy at this time. I explained that she would need to continue her birth control methods for at least one month after the last dosage. She should also get a pregnancy test one month after the last dose. She shouldn't donate blood for one month after the last dose. She should call with any new symptoms of depression.
Upper Range (In Mg/Kg): 150
Male Completion Statement: After discussing his treatment course we decided to discontinue isotretinoin therapy at this time. He shouldn't donate blood for one month after the last dose. He should call with any new symptoms of depression.
Next Month's Dosage: Continue Current Dosage
Xerosis Aggressive Treatment: I recommended application of Cetaphil or CeraVe numerous times a day going to bed to all dry areas. I also prescribed a topical steroid for twice daily use.
Are Labs Available For Review?: No- Labs Deferred This Month
Target Cumulative Dosage (In Mg/Kg): 135

## 2025-04-15 ENCOUNTER — HOSPITAL ENCOUNTER (EMERGENCY)
Facility: MEDICAL CENTER | Age: 25
End: 2025-04-16
Attending: EMERGENCY MEDICINE
Payer: COMMERCIAL

## 2025-04-15 ENCOUNTER — APPOINTMENT (OUTPATIENT)
Dept: RADIOLOGY | Facility: MEDICAL CENTER | Age: 25
End: 2025-04-15
Attending: EMERGENCY MEDICINE
Payer: COMMERCIAL

## 2025-04-15 DIAGNOSIS — A41.9 SEPSIS, DUE TO UNSPECIFIED ORGANISM, UNSPECIFIED WHETHER ACUTE ORGAN DYSFUNCTION PRESENT (HCC): ICD-10-CM

## 2025-04-15 DIAGNOSIS — R05.1 ACUTE COUGH: Primary | ICD-10-CM

## 2025-04-15 DIAGNOSIS — R00.0 SINUS TACHYCARDIA: ICD-10-CM

## 2025-04-15 DIAGNOSIS — D72.829 LEUKOCYTOSIS, UNSPECIFIED TYPE: ICD-10-CM

## 2025-04-15 PROBLEM — T14.90XA TRAUMA: Status: ACTIVE | Noted: 2025-04-15

## 2025-04-15 PROBLEM — R41.82 ALTERED MENTAL STATUS: Status: ACTIVE | Noted: 2025-04-15

## 2025-04-15 LAB
ALBUMIN SERPL BCP-MCNC: 4.4 G/DL (ref 3.2–4.9)
ALBUMIN/GLOB SERPL: 1.3 G/DL
ALP SERPL-CCNC: 114 U/L (ref 30–99)
ALT SERPL-CCNC: 16 U/L (ref 2–50)
ANION GAP SERPL CALC-SCNC: 14 MMOL/L (ref 7–16)
APPEARANCE UR: CLEAR
AST SERPL-CCNC: 21 U/L (ref 12–45)
BASOPHILS # BLD AUTO: 0.3 % (ref 0–1.8)
BASOPHILS # BLD: 0.06 K/UL (ref 0–0.12)
BILIRUB SERPL-MCNC: 0.8 MG/DL (ref 0.1–1.5)
BILIRUB UR QL STRIP.AUTO: NEGATIVE
BUN SERPL-MCNC: 13 MG/DL (ref 8–22)
CALCIUM ALBUM COR SERPL-MCNC: 9.2 MG/DL (ref 8.5–10.5)
CALCIUM SERPL-MCNC: 9.5 MG/DL (ref 8.5–10.5)
CHLORIDE SERPL-SCNC: 103 MMOL/L (ref 96–112)
CO2 SERPL-SCNC: 22 MMOL/L (ref 20–33)
COLOR UR: YELLOW
CREAT SERPL-MCNC: 0.85 MG/DL (ref 0.5–1.4)
EOSINOPHIL # BLD AUTO: 0.03 K/UL (ref 0–0.51)
EOSINOPHIL NFR BLD: 0.2 % (ref 0–6.9)
ERYTHROCYTE [DISTWIDTH] IN BLOOD BY AUTOMATED COUNT: 42.5 FL (ref 35.9–50)
FLUAV RNA SPEC QL NAA+PROBE: NEGATIVE
FLUBV RNA SPEC QL NAA+PROBE: NEGATIVE
GFR SERPLBLD CREATININE-BSD FMLA CKD-EPI: 126 ML/MIN/1.73 M 2
GLOBULIN SER CALC-MCNC: 3.4 G/DL (ref 1.9–3.5)
GLUCOSE SERPL-MCNC: 114 MG/DL (ref 65–99)
GLUCOSE UR STRIP.AUTO-MCNC: NEGATIVE MG/DL
HCT VFR BLD AUTO: 44 % (ref 42–52)
HGB BLD-MCNC: 15.2 G/DL (ref 14–18)
IMM GRANULOCYTES # BLD AUTO: 0.09 K/UL (ref 0–0.11)
IMM GRANULOCYTES NFR BLD AUTO: 0.5 % (ref 0–0.9)
KETONES UR STRIP.AUTO-MCNC: 15 MG/DL
LEUKOCYTE ESTERASE UR QL STRIP.AUTO: NEGATIVE
LIPASE SERPL-CCNC: 14 U/L (ref 11–82)
LYMPHOCYTES # BLD AUTO: 1.25 K/UL (ref 1–4.8)
LYMPHOCYTES NFR BLD: 6.8 % (ref 22–41)
MCH RBC QN AUTO: 31.5 PG (ref 27–33)
MCHC RBC AUTO-ENTMCNC: 34.5 G/DL (ref 32.3–36.5)
MCV RBC AUTO: 91.1 FL (ref 81.4–97.8)
MICRO URNS: ABNORMAL
MONOCYTES # BLD AUTO: 1.38 K/UL (ref 0–0.85)
MONOCYTES NFR BLD AUTO: 7.6 % (ref 0–13.4)
NEUTROPHILS # BLD AUTO: 15.46 K/UL (ref 1.82–7.42)
NEUTROPHILS NFR BLD: 84.6 % (ref 44–72)
NITRITE UR QL STRIP.AUTO: NEGATIVE
NRBC # BLD AUTO: 0 K/UL
NRBC BLD-RTO: 0 /100 WBC (ref 0–0.2)
PH UR STRIP.AUTO: 6.5 [PH] (ref 5–8)
PLATELET # BLD AUTO: 238 K/UL (ref 164–446)
PMV BLD AUTO: 10.6 FL (ref 9–12.9)
POTASSIUM SERPL-SCNC: 3.9 MMOL/L (ref 3.6–5.5)
PROCALCITONIN SERPL-MCNC: 0.11 NG/ML
PROT SERPL-MCNC: 7.8 G/DL (ref 6–8.2)
PROT UR QL STRIP: NEGATIVE MG/DL
RBC # BLD AUTO: 4.83 M/UL (ref 4.7–6.1)
RBC UR QL AUTO: NEGATIVE
RSV RNA SPEC QL NAA+PROBE: NEGATIVE
SARS-COV-2 RNA RESP QL NAA+PROBE: NOTDETECTED
SODIUM SERPL-SCNC: 139 MMOL/L (ref 135–145)
SP GR UR STRIP.AUTO: 1
UROBILINOGEN UR STRIP.AUTO-MCNC: 0.2 EU/DL
WBC # BLD AUTO: 18.3 K/UL (ref 4.8–10.8)

## 2025-04-15 PROCEDURE — 0241U HCHG SARS-COV-2 COVID-19 NFCT DS RESP RNA 4 TRGT ED POC: CPT

## 2025-04-15 PROCEDURE — 700105 HCHG RX REV CODE 258: Performed by: EMERGENCY MEDICINE

## 2025-04-15 PROCEDURE — 36415 COLL VENOUS BLD VENIPUNCTURE: CPT

## 2025-04-15 PROCEDURE — 71045 X-RAY EXAM CHEST 1 VIEW: CPT

## 2025-04-15 PROCEDURE — 96375 TX/PRO/DX INJ NEW DRUG ADDON: CPT

## 2025-04-15 PROCEDURE — 80053 COMPREHEN METABOLIC PANEL: CPT

## 2025-04-15 PROCEDURE — 84145 PROCALCITONIN (PCT): CPT

## 2025-04-15 PROCEDURE — 81003 URINALYSIS AUTO W/O SCOPE: CPT

## 2025-04-15 PROCEDURE — 700102 HCHG RX REV CODE 250 W/ 637 OVERRIDE(OP): Performed by: EMERGENCY MEDICINE

## 2025-04-15 PROCEDURE — 87086 URINE CULTURE/COLONY COUNT: CPT

## 2025-04-15 PROCEDURE — A9270 NON-COVERED ITEM OR SERVICE: HCPCS | Performed by: EMERGENCY MEDICINE

## 2025-04-15 PROCEDURE — 700111 HCHG RX REV CODE 636 W/ 250 OVERRIDE (IP): Mod: JZ | Performed by: EMERGENCY MEDICINE

## 2025-04-15 PROCEDURE — 85025 COMPLETE CBC W/AUTO DIFF WBC: CPT

## 2025-04-15 PROCEDURE — 96365 THER/PROPH/DIAG IV INF INIT: CPT

## 2025-04-15 PROCEDURE — 87040 BLOOD CULTURE FOR BACTERIA: CPT

## 2025-04-15 PROCEDURE — 99284 EMERGENCY DEPT VISIT MOD MDM: CPT

## 2025-04-15 PROCEDURE — 83605 ASSAY OF LACTIC ACID: CPT

## 2025-04-15 PROCEDURE — 83690 ASSAY OF LIPASE: CPT

## 2025-04-15 RX ORDER — AZITHROMYCIN 250 MG/1
500 TABLET, FILM COATED ORAL ONCE
Status: COMPLETED | OUTPATIENT
Start: 2025-04-15 | End: 2025-04-15

## 2025-04-15 RX ORDER — SODIUM CHLORIDE 9 MG/ML
1000 INJECTION, SOLUTION INTRAVENOUS ONCE
Status: COMPLETED | OUTPATIENT
Start: 2025-04-15 | End: 2025-04-16

## 2025-04-15 RX ORDER — ACETAMINOPHEN 325 MG/1
650 TABLET ORAL ONCE
Status: COMPLETED | OUTPATIENT
Start: 2025-04-15 | End: 2025-04-15

## 2025-04-15 RX ORDER — KETOROLAC TROMETHAMINE 15 MG/ML
15 INJECTION, SOLUTION INTRAMUSCULAR; INTRAVENOUS ONCE
Status: COMPLETED | OUTPATIENT
Start: 2025-04-15 | End: 2025-04-15

## 2025-04-15 RX ADMIN — SODIUM CHLORIDE 1000 ML: 9 INJECTION, SOLUTION INTRAVENOUS at 21:40

## 2025-04-15 RX ADMIN — AMPICILLIN AND SULBACTAM 3 G: 1; 2 INJECTION, POWDER, FOR SOLUTION INTRAMUSCULAR; INTRAVENOUS at 23:23

## 2025-04-15 RX ADMIN — AZITHROMYCIN 500 MG: 250 TABLET, FILM COATED ORAL at 23:22

## 2025-04-15 RX ADMIN — ACETAMINOPHEN 650 MG: 325 TABLET, FILM COATED ORAL at 21:21

## 2025-04-15 RX ADMIN — KETOROLAC TROMETHAMINE 15 MG: 15 INJECTION, SOLUTION INTRAMUSCULAR; INTRAVENOUS at 21:20

## 2025-04-15 ASSESSMENT — PAIN DESCRIPTION - PAIN TYPE
TYPE: ACUTE PAIN
TYPE: ACUTE PAIN

## 2025-04-16 VITALS
SYSTOLIC BLOOD PRESSURE: 118 MMHG | BODY MASS INDEX: 23.98 KG/M2 | DIASTOLIC BLOOD PRESSURE: 56 MMHG | TEMPERATURE: 98.4 F | HEIGHT: 72 IN | OXYGEN SATURATION: 95 % | HEART RATE: 70 BPM | WEIGHT: 177.03 LBS | RESPIRATION RATE: 38 BRPM

## 2025-04-16 LAB — LACTATE SERPL-SCNC: 0.8 MMOL/L (ref 0.5–2)

## 2025-04-16 NOTE — ED NOTES
Pt ambulated back to T-1 with a steady gait without incident. Primary assessment complete. Chart up for ERP.

## 2025-04-16 NOTE — ED TRIAGE NOTES
Chief Complaint   Patient presents with    Flu Like Symptoms     X 1.5 week worsening today. Cough, congestion, fever, body aches.  Pt reports taking tylenol approx 1.5 hours ago.        25 yo M to triage for above complaint. COVID protocol ordered.      Pt placed in lobby. Pt educated on triage process. Pt encouraged to alert staff for any changes.     Patient and staff wearing appropriate PPE    /81   Pulse (!) 121   Temp 36.1 °C (97 °F) (Temporal)   Resp 16   Ht 1.829 m (6')   Wt 80.3 kg (177 lb 0.5 oz)   SpO2 95%   BMI 24.01 kg/m²
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
## 2025-04-16 NOTE — DISCHARGE INSTRUCTIONS
We recommend follow-up with primary care in a couple days.  If you do not have 1 we just put in an order and they should be calling you as a referral to set up an appointment.  We recommend you take the medication as prescribed.  If anything worsens please return to the emergency department for evaluation.
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
## 2025-04-16 NOTE — ED NOTES
Pt provided discharge instructions and educated by ERP. Pt verbalized understanding of instructions with no questions at this time. VSS. PIV removed. Pt ambulated back out to the ED lobby without assistance.

## 2025-04-16 NOTE — ED PROVIDER NOTES
ER Provider Note    Scribed for  Radhames Brooks D.O. by Loida Patterson. 4/16/2025   1:04 AM    Primary Care Provider: None noted.     CHIEF COMPLAINT  Chief Complaint   Patient presents with    Flu Like Symptoms     X 1.5 week worsening today. Cough, congestion, fever, body aches.  Pt reports taking tylenol approx 1.5 hours ago.      EXTERNAL RECORDS REVIEWED  Outpatient Notes Patient had a follow up visit with [facility] on 03/16/2023 for closed fracture of distal end of right femur.      HPI/ROS  LIMITATION TO HISTORY   Select: : None  OUTSIDE HISTORIAN(S):  None     Julio Garrett is a 24 y.o. male who presents to the ED for cough that started 2-3 days ago. Patient admits to a cough, congestion, fever, and body aches. He has taken Tylenol at home for his symptoms. Patient denies nausea, vomiting, diarrhea, abdominal pain, and any other symptoms or injuries at this time.  Denies neck pain    PAST MEDICAL HISTORY  No past medical history noted.     SURGICAL HISTORY  Past Surgical History:   Procedure Laterality Date    FEMUR NAILING INTRAMEDULLARY Right 12/12/2022    Procedure: INSERTION, INTRAMEDULLARY JEANMARIE, FEMUR, RETROGRADE;  Surgeon: Mert Pizano M.D.;  Location: SURGERY [facility];  Service: Orthopedics     FAMILY HISTORY  No family history noted.     SOCIAL HISTORY   reports that he has never smoked. He has never used smokeless tobacco. He reports current alcohol use. He reports that he does not use drugs.    CURRENT MEDICATIONS  Previous Medications    OMEPRAZOLE (PRILOSEC) 20 MG DELAYED-RELEASE CAPSULE    Take 1 Cap by mouth every day.     ALLERGIES  No Known Allergies noted.     PHYSICAL EXAM  /71   Pulse 90   Temp 37.6 °C (99.6 °F) (Temporal)   Resp 20   Ht 1.829 m (6')   Wt 80.3 kg (177 lb 0.5 oz)   SpO2 96%   BMI 24.01 kg/m²    General: No acute distress  HENT: Rhinorrhea, congestion   Neuro: Awake alert and oriented, muscle strength sensation normal  Neck: Supple, full  range of motion, no cervical tenderness  Cardiac: Tachycardic rate and regular rhythm, no murmurs rubs or gallops  Pulmonary: Clear to auscultation bilaterally no distress  Abdomen: Soft nontender nondistended  Back: Nontender no CVA tenderness no spinal tenderness  Psych: Normal  Skin: Pink warm dry  Extremities: Full range of motion, muscle strength sensation intact 2+ pulses    DIAGNOSTIC STUDIES/PROCEDURES  Labs:   Labs Reviewed   CBC WITH DIFFERENTIAL - Abnormal; Notable for the following components:       Result Value    WBC 18.3 (*)     Neutrophils-Polys 84.60 (*)     Lymphocytes 6.80 (*)     Neutrophils (Absolute) 15.46 (*)     Monos (Absolute) 1.38 (*)     All other components within normal limits   COMP METABOLIC PANEL - Abnormal; Notable for the following components:    Glucose 114 (*)     Alkaline Phosphatase 114 (*)     All other components within normal limits   URINALYSIS - Abnormal; Notable for the following components:    Ketones 15 (*)     All other components within normal limits   LIPASE   ESTIMATED GFR   LACTIC ACID   PROCALCITONIN   LACTIC ACID   LACTIC ACID   URINE CULTURE(NEW)   BLOOD CULTURE   BLOOD CULTURE   POCT COV-2, FLU A/B, RSV BY PCR   POC COV-2, FLU A/B, RSV BY PCR     I have independently interpreted the above labs    Radiology:   This attending emergency physician has independently interpreted the diagnostic imaging associated with this visit and is awaiting the final reading from the radiologist.   Preliminary interpretation is a follows: Negative  Radiologist interpretation:   DX-CHEST-PORTABLE (1 VIEW)   Final Result         1.  No acute cardiopulmonary disease.         COURSE & MEDICAL DECISION MAKING     Hydration: Based on the patient's presentation of Sepsis the patient was given IV fluids. IV Hydration was used because oral hydration was not adequate alone. Upon recheck following hydration, the patient was improved.  Sepsis: Infection was suspected 22:47 (Time). Sepsis  pathway was initiated. Fluids not needed (no hypotension or lactate greater than or equal to 4). Antibiotics were given per protocol.    INITIAL ASSESSMENT, COURSE AND PLAN  Differential diagnoses include but not limited to: COVID-19, influenza, pneumonia     Care Narrative: 24-year-old with a cough presents with tachycardia.    9:30 PM - Patient was first seen and evaluated at bedside. Patient presents to the ED for viral symptoms. Ordered for DX-Chest-Portable (1 view), lipase, CMP, CBC with differential, POCT CoV-2, Flu A/B, RSV by PCR, and procalcitonin to evaluate. The patient will be medicated with 1L NS infusion IV, Tylenol 650 mg PO, and Toradol 15 mg IM for his symptoms. Patient verbalizes understanding and support with my plan of care.     10:47 PM - I have ordered for POC Group A strep PCR, blood cultures, urine culture, urinalysis, and lactic acid to evaluate. The patient will also be medicated with Zithromax 500 mg PO and Unasyn 3 g IV.     12:53 AM - Patient was reevaluated at bedside. Discussed results with patient and plan for discharge home. Patient will be discharged with a prescription for Augmentin. Patient was given the opportunity for questions which were answered appropriately.  Patient is to return to the ED for new or worsening symptoms or any additional concerns. Patient has verbalized understanding and agreement to this plan.  Patient is stable for discharge at this time.        ED COURSE AND ADDITIONAL PROBLEMS    Sepsis: Patient with leukocytosis and tachycardia and suspected infection.  Sepsis initiated.  Lactate less than 4.  Procalcitonin 0.11.  Patient does have a leukocytosis.  Radiograph benign.  Abdomen soft nontender nondistended.  No rash.  Supple neck.  Normal neurological status.  Urinalysis benign.  Discharged with antibiotics for his cough.  On final reevaluation prior to discharge patient in no distress heart rate in the 70s.  He speaking full sentences with a respiratory  rate of 12.  Cap refills less than 2 seconds.  He is in absolutely no distress.  He and his mother agree to follow-up primary care next 1 to 2 days return to emergency department for anything worsens.    Acute cough: Discharged with antibiotics follow-up    Sinus tachycardia: Resolved with crystalloid    Leukocytosis: Aided in the initiation of sepsis management.    DISPOSITION AND DISCUSSIONS    I have discussed management of the patient with the following physicians and MIKAEL's:  None.     Discussion of management with other QHP or appropriate source(s): None     Escalation of care considered, and ultimately not performed: acute inpatient care management, however at this time, the patient is most appropriate for outpatient management.    Barriers to care at this time, including but not limited to: Patient does not have established PCP.     Decision tools and prescription drugs considered including, but not limited to:  Discharged with Augmentin .    DISPOSITION:  Patient will be discharged home in stable condition.    FOLLOW UP:  [facility], Emergency Dept  [address] [address] [phone]    As needed, If symptoms worsen    OUTPATIENT MEDICATIONS:  New Prescriptions    AMOXICILLIN-CLAVULANATE (AUGMENTIN) 875-125 MG TAB    Take 1 Tablet by mouth 2 times a day for 10 days.     FINAL DIAGNOSIS  1. Acute cough Acute   2. Sinus tachycardia Acute   3. Sepsis, due to unspecified organism, unspecified whether acute organ dysfunction present (HCC) Acute   4. Leukocytosis, unspecified type Acute      Loida BHAKTA (Alexandrea), am scribing for, and in the presence of, Radhames Brooks D.O..    Electronically signed by: Loida Patterson (Alexandrea), 4/16/2025    Radhames BHAKTA D.O. personally performed the services described in this documentation, as scribed by Loida Patterson in my presence, and it is both accurate and complete.      The note accurately reflects work and decisions made  by me.  Radhames Brooks D.O.  4/16/2025  4:40 AM

## 2025-04-18 LAB
BACTERIA UR CULT: NORMAL
SIGNIFICANT IND 70042: NORMAL
SITE SITE: NORMAL
SOURCE SOURCE: NORMAL

## 2025-04-21 LAB
BACTERIA BLD CULT: NORMAL
BACTERIA BLD CULT: NORMAL
SIGNIFICANT IND 70042: NORMAL
SIGNIFICANT IND 70042: NORMAL
SITE SITE: NORMAL
SITE SITE: NORMAL
SOURCE SOURCE: NORMAL
SOURCE SOURCE: NORMAL

## 2025-04-29 ENCOUNTER — HOSPITAL ENCOUNTER (OUTPATIENT)
Facility: MEDICAL CENTER | Age: 25
End: 2025-04-29
Payer: COMMERCIAL

## 2025-04-29 ENCOUNTER — OFFICE VISIT (OUTPATIENT)
Dept: URGENT CARE | Facility: CLINIC | Age: 25
End: 2025-04-29
Payer: COMMERCIAL

## 2025-04-29 VITALS
DIASTOLIC BLOOD PRESSURE: 72 MMHG | HEART RATE: 66 BPM | OXYGEN SATURATION: 99 % | HEIGHT: 72 IN | TEMPERATURE: 98.4 F | SYSTOLIC BLOOD PRESSURE: 128 MMHG | BODY MASS INDEX: 24.3 KG/M2 | RESPIRATION RATE: 18 BRPM | WEIGHT: 179.4 LBS

## 2025-04-29 DIAGNOSIS — J02.9 PHARYNGITIS, UNSPECIFIED ETIOLOGY: ICD-10-CM

## 2025-04-29 DIAGNOSIS — R05.1 ACUTE COUGH: ICD-10-CM

## 2025-04-29 LAB
FLUAV RNA SPEC QL NAA+PROBE: NEGATIVE
FLUBV RNA SPEC QL NAA+PROBE: NEGATIVE
RSV RNA SPEC QL NAA+PROBE: NEGATIVE
S PYO DNA SPEC NAA+PROBE: NOT DETECTED
SARS-COV-2 RNA RESP QL NAA+PROBE: NEGATIVE

## 2025-04-29 PROCEDURE — 87070 CULTURE OTHR SPECIMN AEROBIC: CPT

## 2025-04-29 ASSESSMENT — ENCOUNTER SYMPTOMS
COUGH: 1
SORE THROAT: 1

## 2025-04-29 ASSESSMENT — FIBROSIS 4 INDEX: FIB4 SCORE: 0.49

## 2025-04-29 NOTE — PROGRESS NOTES
CHIEF COMPLAINT  Chief Complaint   Patient presents with    Pharyngitis     [place] was prescribed antibx two weeks ago, Sx still persisting, concerned he has strep     Subjective:   Julio Garrett is a 24 y.o. male who presents to urgent care with concerns for symptoms of sore throat.  Patient reports previously being seen in ER, and sent home with course of Augmentin for the treatment of strep throat.  He reports substantial improvement on course of antibiotics, but states after completing medication symptoms of sore throat returned.  He denies any fever or chills.  Denies any symptoms of headache.  Does endorse mild cough and congestion.  Denies any symptoms of nausea or vomiting.  No rash.  No other pertinent history.       Review of Systems   HENT:  Positive for congestion and sore throat.    Respiratory:  Positive for cough.        PAST MEDICAL HISTORY  Patient Active Problem List    Diagnosis Date Noted    High anion gap metabolic acidosis 12/12/2022    Hypokalemia 12/12/2022    Closed fracture of distal end of right femur (HCC) 12/12/2022    Tobacco dependence 12/12/2022    Gastritis 12/12/2022       SURGICAL HISTORY   has a past surgical history that includes femur nailing intramedullary (Right, 12/12/2022).    ALLERGIES  No Known Allergies    CURRENT MEDICATIONS  Home Medications       Reviewed by Miguel Zuniga Ass't (Medical Assistant) on 04/29/25 at 0916  Med List Status: <None>     Medication Last Dose Status   omeprazole (PRILOSEC) 20 MG delayed-release capsule 12/10/2022 Active                    SOCIAL HISTORY  Social History     Tobacco Use    Smoking status: Never    Smokeless tobacco: Never   Vaping Use    Vaping status: Former    Quit date: 9/6/2020    Substances: Nicotine   Substance and Sexual Activity    Alcohol use: Yes    Drug use: No    Sexual activity: Not on file       FAMILY HISTORY  No family history on file.      Medications, Allergies, and current problem list reviewed  today in Epic.     Objective:     /72 (BP Location: Right arm, Patient Position: Sitting)   Pulse 66   Temp 36.9 °C (98.4 °F) (Temporal)   Resp 18   Ht 1.829 m (6')   Wt 81.4 kg (179 lb 6.4 oz)   SpO2 99%     Physical Exam  Vitals reviewed.   Constitutional:       General: He is not in acute distress.     Appearance: Normal appearance. He is not ill-appearing or toxic-appearing.   HENT:      Head: Normocephalic.      Right Ear: Tympanic membrane normal.      Left Ear: Tympanic membrane normal.      Nose: Nose normal.      Mouth/Throat:      Mouth: Mucous membranes are moist.      Pharynx: Oropharynx is clear. Uvula midline. Posterior oropharyngeal erythema present.      Tonsils: No tonsillar exudate. 1+ on the right. 1+ on the left.   Cardiovascular:      Rate and Rhythm: Normal rate.   Pulmonary:      Effort: Pulmonary effort is normal. No respiratory distress.      Breath sounds: No stridor. No wheezing, rhonchi or rales.   Musculoskeletal:      Cervical back: Neck supple.   Skin:     General: Skin is warm.      Capillary Refill: Capillary refill takes less than 2 seconds.   Neurological:      General: No focal deficit present.      Mental Status: He is alert.         Assessment/Plan:     Diagnosis and associated orders:     1. Pharyngitis, unspecified etiology  POCT GROUP A STREP, PCR    POCT CoV-2, Flu A/B, RSV by PCR      2. Acute cough           Comments/MDM:     Discussed potential viral etiology versus strep.  POC viral swab and POC strep swab completed.  Given recent completion of antibiotics, I did advise patient importance of sterilizing water bottles as well as changing dental hygiene equipment prior to completion of treatment.    POC viral and POC strep swab negative.  Will send for culture and further evaluation.  Continue with Tylenol/Motrin as needed.  Warm salt water gargles for alleviation of swelling and discomfort.  Strict ER and return precautions discussed.  Patient comfortable with  plan.         Differential diagnosis, natural history, supportive care, and indications for immediate follow-up discussed.    Advised the patient to follow-up with the primary care physician for recheck, reevaluation, and consideration of further management.    Please note that this dictation was created using voice recognition software. I have made a reasonable attempt to correct obvious errors, but I expect that there are errors of grammar and possibly content that I did not discover before finalizing the note.    This note was electronically signed by DINA Daugherty

## 2025-04-30 LAB
BACTERIA SPEC RESP CULT: NORMAL
SIGNIFICANT IND 70042: NORMAL
SITE SITE: NORMAL
SOURCE SOURCE: NORMAL

## 2025-05-02 ENCOUNTER — RESULTS FOLLOW-UP (OUTPATIENT)
Dept: URGENT CARE | Facility: PHYSICIAN GROUP | Age: 25
End: 2025-05-02

## (undated) DEVICE — DRAPE SURG STERI-DRAPE 7X11OD - (40EA/CA)

## (undated) DEVICE — DRAPE 36X28IN RAD CARM BND BG - (25/CA) O

## (undated) DEVICE — SUTURE 0 VICRYL PLUS CT-1 - 8 X 18 INCH (12/BX)

## (undated) DEVICE — STAPLER SKIN DISP - (6/BX 10BX/CA) VISISTAT

## (undated) DEVICE — SET EXTENSION WITH 2 PORTS (48EA/CA) ***PART #2C8610 IS A SUBSTITUTE*****

## (undated) DEVICE — TOWEL STOP TIMEOUT SAFETY FLAG (40EA/CA)

## (undated) DEVICE — SLEEVE, VASO, THIGH, MED

## (undated) DEVICE — GLOVE BIOGEL INDICATOR SZ 8 SURGICAL PF LTX - (50/BX 4BX/CA)

## (undated) DEVICE — SUCTION INSTRUMENT YANKAUER OPEN TIP W/O VENT (50EA/CA)

## (undated) DEVICE — DRAPE SURGICAL U 77X120 - (10/CA)

## (undated) DEVICE — GLOVE BIOGEL SZ 8 SURGICAL PF LTX - (50PR/BX 4BX/CA)

## (undated) DEVICE — ROD GUIDE BALL TIP 3.0MM X 1000MM

## (undated) DEVICE — BIT DRILL SHORT 4.2MM X 155MM (4TX2=8)

## (undated) DEVICE — SET LEADWIRE 5 LEAD BEDSIDE DISPOSABLE ECG (1SET OF 5/EA)

## (undated) DEVICE — SUTURE GENERAL

## (undated) DEVICE — GUIDE PIN CALIBRATED (5EA/PK) (4TX6=24)

## (undated) DEVICE — DRAPE LARGE 3 QUARTER - (20/CA)

## (undated) DEVICE — TUBE CONNECTING SUCTION - CLEAR PLASTIC STERILE 72 IN (50EA/CA)

## (undated) DEVICE — SUTURE 2-0 VICRYL PLUS CT-1 - 8 X 18 INCH(12/BX)

## (undated) DEVICE — PENCIL ELECTSURG 10FT HLSTR - WITH BLADE (50EA/CA)

## (undated) DEVICE — BIT DRILL LONG CALIBRATED 4.2MM X 330MM (4TX2=8)

## (undated) DEVICE — TUBING CLEARLINK DUO-VENT - C-FLO (48EA/CA)

## (undated) DEVICE — DRAPE C ARMOR (12EA/CA)

## (undated) DEVICE — COVER LIGHT HANDLE ALC PLUS DISP (18EA/BX)

## (undated) DEVICE — DRESSING TRANSPARENT FILM TEGADERM 4 X 4.75" (50EA/BX)"

## (undated) DEVICE — CANISTER SUCTION 3000ML MECHANICAL FILTER AUTO SHUTOFF MEDI-VAC NONSTERILE LF DISP  (40EA/CA)

## (undated) DEVICE — SENSOR OXIMETER ADULT SPO2 RD SET (20EA/BX)

## (undated) DEVICE — WRAP COBAN SELF-ADHERENT 6 IN X  5YDS STERILE TAN (12/CA)

## (undated) DEVICE — PACK MAJOR ORTHO - (2EA/CA)

## (undated) DEVICE — SUCTION INSTRUMENT YANKAUER BULBOUS TIP W/O VENT (50EA/CA)

## (undated) DEVICE — CHLORAPREP 26 ML APPLICATOR - ORANGE TINT(25/CA)

## (undated) DEVICE — ELECTRODE DUAL RETURN W/ CORD - (50/PK)

## (undated) DEVICE — TOWELS CLOTH SURGICAL - (4/PK 20PK/CA)

## (undated) DEVICE — LACTATED RINGERS INJ 1000 ML - (14EA/CA 60CA/PF)

## (undated) DEVICE — GOWN SURGEONS X-LARGE - DISP. (30/CA)

## (undated) DEVICE — BAG SPONGE COUNT 10.25 X 32 - BLUE (250/CA)

## (undated) DEVICE — DRAPE U ORTHOPEDIC - (10/BX)

## (undated) DEVICE — SODIUM CHL IRRIGATION 0.9% 1000ML (12EA/CA)

## (undated) DEVICE — GOWN WARMING STANDARD FLEX - (30/CA)